# Patient Record
Sex: FEMALE | Race: WHITE | Employment: OTHER | URBAN - METROPOLITAN AREA
[De-identification: names, ages, dates, MRNs, and addresses within clinical notes are randomized per-mention and may not be internally consistent; named-entity substitution may affect disease eponyms.]

---

## 2017-01-04 RX ORDER — BUSPIRONE HYDROCHLORIDE 7.5 MG/1
TABLET ORAL
Qty: 60 TABLET | Refills: 5 | Status: ON HOLD | OUTPATIENT
Start: 2017-01-04 | End: 2017-03-09 | Stop reason: HOSPADM

## 2017-01-10 RX ORDER — PETROLATUM 430 MG/G
OINTMENT TOPICAL
Qty: 226 G | Refills: 6 | Status: ON HOLD | OUTPATIENT
Start: 2017-01-10 | End: 2020-01-01

## 2017-01-19 ENCOUNTER — HOSPITAL ENCOUNTER (OUTPATIENT)
Dept: OTHER | Age: 77
Discharge: HOME | End: 2017-01-19
Attending: INTERNAL MEDICINE | Admitting: INTERNAL MEDICINE

## 2017-01-19 LAB
BACTERIA: ABNORMAL /HPF
BILIRUBIN URINE: NEGATIVE MG/DL
BLOOD, URINE: ABNORMAL
CLARITY: ABNORMAL
COLOR: YELLOW
GLUCOSE, URINE: NEGATIVE MG/DL
KETONES, URINE: NEGATIVE MG/DL
LEUKOCYTE ESTERASE, URINE: ABNORMAL
MUCUS: ABNORMAL HPF
NITRITE URINE, QUANTITATIVE: NEGATIVE
PH, URINE: 6 (ref 5–8)
PROTEIN UA: 100 MG/DL
RBC URINE: 254 /HPF (ref 0–6)
SPECIFIC GRAVITY UA: 1.01 (ref 1–1.03)
SQUAMOUS EPITHELIAL: <1 /HPF
UROBILINOGEN, URINE: NORMAL EU/DL (ref 0.2–1)
WBC CLUMP: ABNORMAL /HPF
WBC UA: 128 /HPF (ref 0–5)

## 2017-01-19 RX ORDER — DILTIAZEM HYDROCHLORIDE 240 MG/1
CAPSULE, COATED, EXTENDED RELEASE ORAL
Qty: 30 CAPSULE | Refills: 2 | Status: ON HOLD | OUTPATIENT
Start: 2017-01-19 | End: 2017-03-09 | Stop reason: HOSPADM

## 2017-01-20 RX ORDER — NITROFURANTOIN 25; 75 MG/1; MG/1
100 CAPSULE ORAL 2 TIMES DAILY
Qty: 14 CAPSULE | Refills: 0 | Status: SHIPPED | OUTPATIENT
Start: 2017-01-20 | End: 2017-01-25

## 2017-01-21 LAB
CULTURE: NORMAL
ORGANISM: NORMAL
REPORT STATUS: NORMAL
REQUEST PROBLEM: NORMAL
SPECIMEN: NORMAL
TOTAL COLONY COUNT: NORMAL

## 2017-01-23 RX ORDER — ATORVASTATIN CALCIUM 10 MG/1
TABLET, FILM COATED ORAL
Qty: 30 TABLET | Refills: 2 | Status: SHIPPED | OUTPATIENT
Start: 2017-01-23 | End: 2017-04-17 | Stop reason: SDUPTHER

## 2017-01-24 DIAGNOSIS — Z79.01 ANTICOAGULANT LONG-TERM USE: ICD-10-CM

## 2017-01-24 DIAGNOSIS — R32 URINARY INCONTINENCE, UNSPECIFIED TYPE: Primary | ICD-10-CM

## 2017-01-24 DIAGNOSIS — N39.0 URINARY TRACT INFECTION, SITE UNSPECIFIED: ICD-10-CM

## 2017-01-24 PROCEDURE — 85610 PROTHROMBIN TIME: CPT | Performed by: INTERNAL MEDICINE

## 2017-01-24 RX ORDER — CEFTRIAXONE 1 G/1
0.5 INJECTION, POWDER, FOR SOLUTION INTRAMUSCULAR; INTRAVENOUS EVERY 24 HOURS
Qty: 7 G | Refills: 0 | Status: SHIPPED | OUTPATIENT
Start: 2017-01-24 | End: 2017-02-03

## 2017-01-25 ENCOUNTER — TELEPHONE (OUTPATIENT)
Dept: INTERNAL MEDICINE CLINIC | Age: 77
End: 2017-01-25

## 2017-01-25 DIAGNOSIS — E11.41 CONTROLLED TYPE 2 DIABETES MELLITUS WITH DIABETIC MONONEUROPATHY, WITH LONG-TERM CURRENT USE OF INSULIN (HCC): ICD-10-CM

## 2017-01-25 DIAGNOSIS — Z79.4 CONTROLLED TYPE 2 DIABETES MELLITUS WITH DIABETIC MONONEUROPATHY, WITH LONG-TERM CURRENT USE OF INSULIN (HCC): ICD-10-CM

## 2017-01-25 RX ORDER — LIDOCAINE HYDROCHLORIDE 10 MG/ML
1 INJECTION, SOLUTION EPIDURAL; INFILTRATION; INTRACAUDAL; PERINEURAL DAILY
Qty: 10 ML | Refills: 0 | Status: ON HOLD | OUTPATIENT
Start: 2017-01-25 | End: 2017-03-09

## 2017-02-02 DIAGNOSIS — F32.A DEPRESSION, UNSPECIFIED DEPRESSION TYPE: ICD-10-CM

## 2017-02-02 RX ORDER — PAROXETINE HYDROCHLORIDE 40 MG/1
TABLET, FILM COATED ORAL
Qty: 30 TABLET | Refills: 2 | Status: SHIPPED | OUTPATIENT
Start: 2017-02-02

## 2017-02-02 RX ORDER — RANITIDINE 150 MG/1
TABLET ORAL
Qty: 60 TABLET | Refills: 5 | Status: ON HOLD | OUTPATIENT
Start: 2017-02-02 | End: 2017-03-09 | Stop reason: HOSPADM

## 2017-02-02 RX ORDER — DONEPEZIL HYDROCHLORIDE 5 MG/1
TABLET, FILM COATED ORAL
Qty: 30 TABLET | Refills: 1 | Status: SHIPPED | OUTPATIENT
Start: 2017-02-02 | End: 2017-02-14 | Stop reason: SDUPTHER

## 2017-02-08 ENCOUNTER — TELEPHONE (OUTPATIENT)
Dept: INTERNAL MEDICINE CLINIC | Age: 77
End: 2017-02-08

## 2017-02-08 ENCOUNTER — HOSPITAL ENCOUNTER (OUTPATIENT)
Dept: OTHER | Age: 77
Discharge: HOME | End: 2017-02-08
Attending: INTERNAL MEDICINE | Admitting: INTERNAL MEDICINE

## 2017-02-08 LAB
BACTERIA: ABNORMAL /HPF
BILIRUBIN URINE: NEGATIVE MG/DL
BLOOD, URINE: ABNORMAL
CLARITY: ABNORMAL
COLOR: ABNORMAL
GLUCOSE, URINE: NEGATIVE MG/DL
KETONES, URINE: NEGATIVE MG/DL
LEUKOCYTE ESTERASE, URINE: ABNORMAL
MUCUS: ABNORMAL HPF
NITRITE URINE, QUANTITATIVE: NEGATIVE
PH, URINE: 5 (ref 5–8)
PROTEIN UA: NEGATIVE MG/DL
RBC URINE: 34 /HPF (ref 0–6)
SPECIFIC GRAVITY UA: 1.01 (ref 1–1.03)
SQUAMOUS EPITHELIAL: 1 /HPF
UROBILINOGEN, URINE: NORMAL EU/DL (ref 0.2–1)
WBC CLUMP: ABNORMAL /HPF
WBC UA: 76 /HPF (ref 0–5)
YEAST: ABNORMAL /HPF

## 2017-02-09 LAB
CULTURE: NORMAL
CULTURE: NORMAL
REPORT STATUS: NORMAL
REQUEST PROBLEM: NORMAL
SPECIMEN: NORMAL
TOTAL COLONY COUNT: NORMAL

## 2017-02-14 ENCOUNTER — OFFICE VISIT (OUTPATIENT)
Dept: INTERNAL MEDICINE CLINIC | Age: 77
End: 2017-02-14

## 2017-02-14 VITALS
DIASTOLIC BLOOD PRESSURE: 80 MMHG | SYSTOLIC BLOOD PRESSURE: 122 MMHG | OXYGEN SATURATION: 96 % | HEART RATE: 83 BPM | RESPIRATION RATE: 14 BRPM

## 2017-02-14 DIAGNOSIS — J42 CHRONIC BRONCHITIS, UNSPECIFIED CHRONIC BRONCHITIS TYPE (HCC): ICD-10-CM

## 2017-02-14 DIAGNOSIS — E11.41 CONTROLLED TYPE 2 DIABETES MELLITUS WITH DIABETIC MONONEUROPATHY, WITH LONG-TERM CURRENT USE OF INSULIN (HCC): ICD-10-CM

## 2017-02-14 DIAGNOSIS — Z79.4 CONTROLLED TYPE 2 DIABETES MELLITUS WITH DIABETIC MONONEUROPATHY, WITH LONG-TERM CURRENT USE OF INSULIN (HCC): ICD-10-CM

## 2017-02-14 DIAGNOSIS — I48.20 ATRIAL FIBRILLATION, CHRONIC (HCC): ICD-10-CM

## 2017-02-14 DIAGNOSIS — I10 ESSENTIAL HYPERTENSION: ICD-10-CM

## 2017-02-14 DIAGNOSIS — J01.00 ACUTE NON-RECURRENT MAXILLARY SINUSITIS: ICD-10-CM

## 2017-02-14 DIAGNOSIS — M75.81 RIGHT ROTATOR CUFF TENDINITIS: Primary | ICD-10-CM

## 2017-02-14 DIAGNOSIS — R41.3 MEMORY LOSS: ICD-10-CM

## 2017-02-14 PROCEDURE — 99214 OFFICE O/P EST MOD 30 MIN: CPT | Performed by: INTERNAL MEDICINE

## 2017-02-14 RX ORDER — PREDNISONE 1 MG/1
TABLET ORAL
Qty: 21 TABLET | Refills: 0 | Status: ON HOLD | OUTPATIENT
Start: 2017-02-14 | End: 2017-03-09 | Stop reason: HOSPADM

## 2017-02-14 RX ORDER — DONEPEZIL HYDROCHLORIDE 5 MG/1
TABLET, FILM COATED ORAL
Qty: 30 TABLET | Refills: 3 | Status: ON HOLD | OUTPATIENT
Start: 2017-02-14 | End: 2017-03-09 | Stop reason: HOSPADM

## 2017-02-14 RX ORDER — AZITHROMYCIN 250 MG/1
TABLET, FILM COATED ORAL
Qty: 6 TABLET | Refills: 0 | Status: SHIPPED | OUTPATIENT
Start: 2017-02-14 | End: 2017-03-05

## 2017-02-18 DIAGNOSIS — I10 ESSENTIAL HYPERTENSION: ICD-10-CM

## 2017-02-20 RX ORDER — VALSARTAN 160 MG/1
TABLET ORAL
Qty: 60 TABLET | Refills: 1 | Status: SHIPPED | OUTPATIENT
Start: 2017-02-20 | End: 2017-04-17 | Stop reason: SDUPTHER

## 2017-02-20 RX ORDER — DICYCLOMINE HYDROCHLORIDE 10 MG/1
CAPSULE ORAL
Qty: 120 CAPSULE | Refills: 2 | Status: ON HOLD | OUTPATIENT
Start: 2017-02-20 | End: 2017-03-09 | Stop reason: HOSPADM

## 2017-02-22 ENCOUNTER — TELEPHONE (OUTPATIENT)
Dept: INTERNAL MEDICINE CLINIC | Age: 77
End: 2017-02-22

## 2017-02-22 RX ORDER — WARFARIN SODIUM 2.5 MG/1
TABLET ORAL
Qty: 30 TABLET | Refills: 5 | Status: ON HOLD
Start: 2017-02-22 | End: 2017-03-09 | Stop reason: HOSPADM

## 2017-03-01 ENCOUNTER — HOSPITAL ENCOUNTER (OUTPATIENT)
Dept: OTHER | Age: 77
Discharge: HOME | End: 2017-03-01
Attending: INTERNAL MEDICINE | Admitting: INTERNAL MEDICINE

## 2017-03-01 LAB
ALBUMIN SERPL-MCNC: 3.6 GM/DL (ref 3.4–5)
ALP BLD-CCNC: 84 IU/L (ref 40–128)
ALT SERPL-CCNC: 9 U/L (ref 10–40)
ANION GAP SERPL CALCULATED.3IONS-SCNC: 12 MMOL/L (ref 4–16)
AST SERPL-CCNC: 10 IU/L (ref 15–37)
BASOPHILS ABSOLUTE: 0.1 K/CU MM
BASOPHILS RELATIVE PERCENT: 0.5 % (ref 0–1)
BILIRUB SERPL-MCNC: 0.3 MG/DL (ref 0–1)
BUN BLDV-MCNC: 21 MG/DL (ref 6–23)
CALCIUM SERPL-MCNC: 8.8 MG/DL (ref 8.3–10.6)
CHLORIDE BLD-SCNC: 102 MMOL/L (ref 99–110)
CHOLESTEROL: 103 MG/DL
CO2: 29 MMOL/L (ref 21–32)
CREAT SERPL-MCNC: 1.2 MG/DL (ref 0.6–1.1)
DIFFERENTIAL TYPE: ABNORMAL
EOSINOPHILS ABSOLUTE: 0.4 K/CU MM
EOSINOPHILS RELATIVE PERCENT: 3.9 % (ref 0–3)
ESTIMATED AVERAGE GLUCOSE: 114 MG/DL
GFR AFRICAN AMERICAN: 53 ML/MIN/1.73M2
GFR NON-AFRICAN AMERICAN: 43 ML/MIN/1.73M2
GLUCOSE FASTING: 85 MG/DL (ref 70–99)
HBA1C MFR BLD: 5.6 % (ref 4.2–6.3)
HCT VFR BLD CALC: 41.9 % (ref 37–47)
HDLC SERPL-MCNC: 44 MG/DL
HEMOGLOBIN: 12.3 GM/DL (ref 12.5–16)
IMMATURE NEUTROPHIL %: 0.7 % (ref 0–0.43)
LDL CHOLESTEROL DIRECT: 56 MG/DL
LYMPHOCYTES ABSOLUTE: 1.4 K/CU MM
LYMPHOCYTES RELATIVE PERCENT: 14.2 % (ref 24–44)
MCH RBC QN AUTO: 28.1 PG (ref 27–31)
MCHC RBC AUTO-ENTMCNC: 29.4 % (ref 32–36)
MCV RBC AUTO: 95.7 FL (ref 78–100)
MONOCYTES ABSOLUTE: 0.9 K/CU MM
MONOCYTES RELATIVE PERCENT: 9.6 % (ref 0–4)
NUCLEATED RBC %: 0 %
PDW BLD-RTO: 14.4 % (ref 11.7–14.9)
PLATELET # BLD: 277 K/CU MM (ref 140–440)
PMV BLD AUTO: 10.8 FL (ref 7.5–11.1)
POTASSIUM SERPL-SCNC: 4.7 MMOL/L (ref 3.5–5.1)
RBC # BLD: 4.38 M/CU MM (ref 4.2–5.4)
SEGMENTED NEUTROPHILS ABSOLUTE COUNT: 7 K/CU MM
SEGMENTED NEUTROPHILS RELATIVE PERCENT: 71.1 % (ref 36–66)
SODIUM BLD-SCNC: 143 MMOL/L (ref 135–145)
TOTAL IMMATURE NEUTOROPHIL: 0.07 K/CU MM
TOTAL NUCLEATED RBC: 0 K/CU MM
TOTAL PROTEIN: 6.4 GM/DL (ref 6.4–8.2)
TRIGL SERPL-MCNC: 91 MG/DL
TSH HIGH SENSITIVITY: 2.82 UIU/ML (ref 0.27–4.2)
WBC # BLD: 9.8 K/CU MM (ref 4–10.5)

## 2017-03-08 PROBLEM — N17.9 ACUTE KIDNEY INJURY (HCC): Status: ACTIVE | Noted: 2017-03-08

## 2017-03-09 PROBLEM — A41.50 SEPSIS DUE TO GRAM NEGATIVE BACTERIA (HCC): Status: ACTIVE | Noted: 2017-03-09

## 2017-03-20 DIAGNOSIS — I10 ESSENTIAL HYPERTENSION: ICD-10-CM

## 2017-03-20 RX ORDER — HYDROCHLOROTHIAZIDE 25 MG/1
TABLET ORAL
Qty: 30 TABLET | Refills: 2 | Status: ON HOLD | OUTPATIENT
Start: 2017-03-20 | End: 2020-01-01 | Stop reason: HOSPADM

## 2017-04-17 DIAGNOSIS — R32 URINARY INCONTINENCE, UNSPECIFIED TYPE: ICD-10-CM

## 2017-04-17 DIAGNOSIS — I10 ESSENTIAL HYPERTENSION: ICD-10-CM

## 2017-04-17 RX ORDER — SOLIFENACIN SUCCINATE 10 MG/1
TABLET, FILM COATED ORAL
Qty: 30 TABLET | Refills: 3 | Status: SHIPPED | OUTPATIENT
Start: 2017-04-17

## 2017-04-17 RX ORDER — VALSARTAN 160 MG/1
TABLET ORAL
Qty: 60 TABLET | Refills: 1 | Status: ON HOLD | OUTPATIENT
Start: 2017-04-17 | End: 2020-01-01

## 2017-04-17 RX ORDER — ATORVASTATIN CALCIUM 10 MG/1
TABLET, FILM COATED ORAL
Qty: 30 TABLET | Refills: 2 | Status: SHIPPED | OUTPATIENT
Start: 2017-04-17

## 2017-04-17 RX ORDER — GLIPIZIDE 5 MG/1
TABLET ORAL
Qty: 60 TABLET | Refills: 5 | Status: ON HOLD | OUTPATIENT
Start: 2017-04-17 | End: 2019-01-01 | Stop reason: HOSPADM

## 2017-05-02 ENCOUNTER — OFFICE VISIT (OUTPATIENT)
Dept: CARDIOLOGY CLINIC | Age: 77
End: 2017-05-02

## 2017-05-02 VITALS — DIASTOLIC BLOOD PRESSURE: 64 MMHG | HEART RATE: 100 BPM | SYSTOLIC BLOOD PRESSURE: 122 MMHG

## 2017-05-02 DIAGNOSIS — I10 ESSENTIAL HYPERTENSION: ICD-10-CM

## 2017-05-02 DIAGNOSIS — I48.0 PAROXYSMAL ATRIAL FIBRILLATION (HCC): Primary | ICD-10-CM

## 2017-05-02 PROCEDURE — 99214 OFFICE O/P EST MOD 30 MIN: CPT | Performed by: INTERNAL MEDICINE

## 2018-04-16 ENCOUNTER — ANTI-COAG VISIT (OUTPATIENT)
Dept: INTERNAL MEDICINE CLINIC | Age: 78
End: 2018-04-16

## 2018-08-07 ENCOUNTER — HOSPITAL ENCOUNTER (OUTPATIENT)
Dept: SPEECH THERAPY | Age: 78
Discharge: HOME | End: 2018-08-07
Admitting: INTERNAL MEDICINE

## 2018-08-07 DIAGNOSIS — R13.14 DYSPHAGIA, PHARYNGOESOPHAGEAL: ICD-10-CM

## 2018-08-07 NOTE — PROCEDURES
INSTRUMENTAL SWALLOW REPORT  MODIFIED BARIUM SWALLOW      Thank you for referring Kayleen BOWLING  1940 for a modified barium swallow study. Please see attached results and contact me at your convenience if you have any questions or concerns. Elisha David MA, CCC-SLP  Speech/Language Pathologist  [facility]  Department of [facility] Pathology  [phone]  2018  1:28 PM      NAME: Kayleen Torres   : 1940  MRN: [id number]       Date of Eval: 2018     Ordering Physician: Dr. Matt Ramos  Radiologist: Available upon consult  ENT: N/a  Referring Diagnosis(es): Referring Diagnosis: R13.14    Past Medical History:  has a past medical history of Allergic rhinitis; Asthma; Atrial fibrillation ([address] CAD (coronary artery disease); Carotid artery stenosis; Carpal tunnel syndrome, bilateral; COPD (chronic obstructive pulmonary disease) ([address] Depression; Diabetes type 2, controlled ([address] Diabetes type 2, controlled ([address] Emphysema; Family history of diabetes mellitus (DM); Gastro-esophageal reflux disease with esophagitis; H/O 24 hour EKG monitoring; H/O cardiac catheterization; H/O cardiovascular stress test; H/O Doppler ultrasound; H/O echocardiogram; Herniation of lumbar intervertebral disc; Hyperlipidemia; Hypertension; Incontinence of urine; Irritable bowel syndrome; Memory loss; Menopause; Obesity; Obstructive sleep apnea on CPAP; Osteoarthritis; S/P colonoscopy; and Venous insufficiency (chronic) (peripheral). Past Surgical History:  has a past surgical history that includes Carpal tunnel release (); Nasal polyp surgery (); Appendectomy (); Cholecystectomy (); lipoma resection (); and Diagnostic Cardiac Cath Lab Procedure (2003, 2001). Current Diet Solid Consistency: Dysphagia II Mechanically Altered  Current Diet Liquid Consistency:  Thin    Date of Prior Study: N/a  Type of Study: Initial MBS  Results of

## 2019-01-01 ENCOUNTER — TELEPHONE (OUTPATIENT)
Dept: ORTHOPEDIC SURGERY | Age: 79
End: 2019-01-01

## 2019-01-01 ENCOUNTER — APPOINTMENT (OUTPATIENT)
Dept: GENERAL RADIOLOGY | Age: 79
DRG: 580 | End: 2019-01-01
Payer: MEDICARE

## 2019-01-01 ENCOUNTER — ANESTHESIA (OUTPATIENT)
Dept: OPERATING ROOM | Age: 79
DRG: 580 | End: 2019-01-01
Payer: MEDICARE

## 2019-01-01 ENCOUNTER — ANESTHESIA EVENT (OUTPATIENT)
Dept: OPERATING ROOM | Age: 79
DRG: 580 | End: 2019-01-01
Payer: MEDICARE

## 2019-01-01 ENCOUNTER — APPOINTMENT (OUTPATIENT)
Dept: CT IMAGING | Age: 79
DRG: 580 | End: 2019-01-01
Payer: MEDICARE

## 2019-01-01 ENCOUNTER — HOSPITAL ENCOUNTER (INPATIENT)
Age: 79
LOS: 2 days | Discharge: NURSING, REHAB OR LONG-TERM CARE | DRG: 580 | End: 2019-12-08
Attending: EMERGENCY MEDICINE | Admitting: INTERNAL MEDICINE
Payer: MEDICARE

## 2019-01-01 ENCOUNTER — OFFICE VISIT (OUTPATIENT)
Dept: ORTHOPEDIC SURGERY | Age: 79
End: 2019-01-01

## 2019-01-01 VITALS
WEIGHT: 184.08 LBS | RESPIRATION RATE: 16 BRPM | OXYGEN SATURATION: 92 % | BODY MASS INDEX: 27.27 KG/M2 | HEART RATE: 74 BPM | HEIGHT: 69 IN

## 2019-01-01 VITALS
HEART RATE: 69 BPM | BODY MASS INDEX: 27.25 KG/M2 | SYSTOLIC BLOOD PRESSURE: 117 MMHG | WEIGHT: 184 LBS | TEMPERATURE: 98 F | DIASTOLIC BLOOD PRESSURE: 104 MMHG | HEIGHT: 69 IN | OXYGEN SATURATION: 98 % | RESPIRATION RATE: 17 BRPM

## 2019-01-01 VITALS
SYSTOLIC BLOOD PRESSURE: 105 MMHG | RESPIRATION RATE: 1 BRPM | DIASTOLIC BLOOD PRESSURE: 70 MMHG | OXYGEN SATURATION: 100 %

## 2019-01-01 DIAGNOSIS — Z09 POSTOP CHECK: Primary | ICD-10-CM

## 2019-01-01 DIAGNOSIS — S80.11XA TRAUMATIC HEMATOMA OF RIGHT LOWER LEG, INITIAL ENCOUNTER: Primary | ICD-10-CM

## 2019-01-01 DIAGNOSIS — I96 SKIN NECROSIS (HCC): ICD-10-CM

## 2019-01-01 DIAGNOSIS — Z79.01 ANTICOAGULATED: ICD-10-CM

## 2019-01-01 DIAGNOSIS — R29.898 WEAKNESS OF RIGHT LOWER EXTREMITY: ICD-10-CM

## 2019-01-01 LAB
ABO/RH: NORMAL
ALBUMIN SERPL-MCNC: 3.4 GM/DL (ref 3.4–5)
ALBUMIN SERPL-MCNC: 3.7 GM/DL (ref 3.4–5)
ALBUMIN SERPL-MCNC: 3.7 GM/DL (ref 3.4–5)
ALP BLD-CCNC: 84 IU/L (ref 40–128)
ALP BLD-CCNC: 95 IU/L (ref 40–128)
ALP BLD-CCNC: 96 IU/L (ref 40–128)
ALT SERPL-CCNC: 5 U/L (ref 10–40)
ALT SERPL-CCNC: <5 U/L (ref 10–40)
ALT SERPL-CCNC: <5 U/L (ref 10–40)
ANION GAP SERPL CALCULATED.3IONS-SCNC: 14 MMOL/L (ref 4–16)
ANION GAP SERPL CALCULATED.3IONS-SCNC: 16 MMOL/L (ref 4–16)
ANION GAP SERPL CALCULATED.3IONS-SCNC: 16 MMOL/L (ref 4–16)
ANTIBODY SCREEN: NEGATIVE
APTT: 43.8 SECONDS (ref 25.1–37.1)
AST SERPL-CCNC: 11 IU/L (ref 15–37)
AST SERPL-CCNC: 11 IU/L (ref 15–37)
AST SERPL-CCNC: 12 IU/L (ref 15–37)
BASOPHILS ABSOLUTE: 0 K/CU MM
BASOPHILS ABSOLUTE: 0 K/CU MM
BASOPHILS RELATIVE PERCENT: 0.3 % (ref 0–1)
BASOPHILS RELATIVE PERCENT: 0.6 % (ref 0–1)
BILIRUB SERPL-MCNC: 0.4 MG/DL (ref 0–1)
BILIRUB SERPL-MCNC: 0.4 MG/DL (ref 0–1)
BILIRUB SERPL-MCNC: 0.5 MG/DL (ref 0–1)
BUN BLDV-MCNC: 11 MG/DL (ref 6–23)
BUN BLDV-MCNC: 14 MG/DL (ref 6–23)
BUN BLDV-MCNC: 18 MG/DL (ref 6–23)
CALCIUM SERPL-MCNC: 8.4 MG/DL (ref 8.3–10.6)
CALCIUM SERPL-MCNC: 8.7 MG/DL (ref 8.3–10.6)
CALCIUM SERPL-MCNC: 8.8 MG/DL (ref 8.3–10.6)
CHLORIDE BLD-SCNC: 97 MMOL/L (ref 99–110)
CHLORIDE BLD-SCNC: 99 MMOL/L (ref 99–110)
CHLORIDE BLD-SCNC: 99 MMOL/L (ref 99–110)
CO2: 19 MMOL/L (ref 21–32)
CO2: 20 MMOL/L (ref 21–32)
CO2: 25 MMOL/L (ref 21–32)
CREAT SERPL-MCNC: 0.8 MG/DL (ref 0.6–1.1)
CREAT SERPL-MCNC: 1 MG/DL (ref 0.6–1.1)
CREAT SERPL-MCNC: 1.2 MG/DL (ref 0.6–1.1)
DIFFERENTIAL TYPE: ABNORMAL
DIFFERENTIAL TYPE: ABNORMAL
EKG DIAGNOSIS: NORMAL
EKG Q-T INTERVAL: 410 MS
EKG QRS DURATION: 74 MS
EKG QTC CALCULATION (BAZETT): 416 MS
EKG R AXIS: -16 DEGREES
EKG T AXIS: 23 DEGREES
EKG VENTRICULAR RATE: 62 BPM
EOSINOPHILS ABSOLUTE: 0 K/CU MM
EOSINOPHILS ABSOLUTE: 0.2 K/CU MM
EOSINOPHILS RELATIVE PERCENT: 0.5 % (ref 0–3)
EOSINOPHILS RELATIVE PERCENT: 3 % (ref 0–3)
ESTIMATED AVERAGE GLUCOSE: 88 MG/DL
GFR AFRICAN AMERICAN: 52 ML/MIN/1.73M2
GFR AFRICAN AMERICAN: >60 ML/MIN/1.73M2
GFR AFRICAN AMERICAN: >60 ML/MIN/1.73M2
GFR NON-AFRICAN AMERICAN: 43 ML/MIN/1.73M2
GFR NON-AFRICAN AMERICAN: 53 ML/MIN/1.73M2
GFR NON-AFRICAN AMERICAN: >60 ML/MIN/1.73M2
GLUCOSE BLD-MCNC: 100 MG/DL (ref 70–99)
GLUCOSE BLD-MCNC: 107 MG/DL (ref 70–99)
GLUCOSE BLD-MCNC: 115 MG/DL (ref 70–99)
GLUCOSE BLD-MCNC: 118 MG/DL (ref 70–99)
GLUCOSE BLD-MCNC: 124 MG/DL (ref 70–99)
GLUCOSE BLD-MCNC: 128 MG/DL (ref 70–99)
GLUCOSE BLD-MCNC: 135 MG/DL (ref 70–99)
GLUCOSE BLD-MCNC: 173 MG/DL (ref 70–99)
GLUCOSE BLD-MCNC: 196 MG/DL (ref 70–99)
GLUCOSE BLD-MCNC: 69 MG/DL (ref 70–99)
GLUCOSE BLD-MCNC: 69 MG/DL (ref 70–99)
GLUCOSE BLD-MCNC: 71 MG/DL (ref 70–99)
GLUCOSE BLD-MCNC: 84 MG/DL (ref 70–99)
GLUCOSE BLD-MCNC: 85 MG/DL (ref 70–99)
GLUCOSE BLD-MCNC: 96 MG/DL (ref 70–99)
GLUCOSE BLD-MCNC: 97 MG/DL (ref 70–99)
HBA1C MFR BLD: 4.7 % (ref 4.2–6.3)
HCT VFR BLD CALC: 36 % (ref 37–47)
HCT VFR BLD CALC: 38.9 % (ref 37–47)
HEMOGLOBIN: 10.3 GM/DL (ref 12.5–16)
HEMOGLOBIN: 11.7 GM/DL (ref 12.5–16)
IMMATURE NEUTROPHIL %: 0.4 % (ref 0–0.43)
IMMATURE NEUTROPHIL %: 0.4 % (ref 0–0.43)
INR BLD: 1.51 INDEX
LYMPHOCYTES ABSOLUTE: 0.7 K/CU MM
LYMPHOCYTES ABSOLUTE: 1 K/CU MM
LYMPHOCYTES RELATIVE PERCENT: 14.4 % (ref 24–44)
LYMPHOCYTES RELATIVE PERCENT: 9.1 % (ref 24–44)
MAGNESIUM: 1.6 MG/DL (ref 1.8–2.4)
MCH RBC QN AUTO: 30.8 PG (ref 27–31)
MCH RBC QN AUTO: 31.3 PG (ref 27–31)
MCHC RBC AUTO-ENTMCNC: 28.6 % (ref 32–36)
MCHC RBC AUTO-ENTMCNC: 30.1 % (ref 32–36)
MCV RBC AUTO: 102.4 FL (ref 78–100)
MCV RBC AUTO: 109.4 FL (ref 78–100)
MONOCYTES ABSOLUTE: 0.6 K/CU MM
MONOCYTES ABSOLUTE: 0.8 K/CU MM
MONOCYTES RELATIVE PERCENT: 11.3 % (ref 0–4)
MONOCYTES RELATIVE PERCENT: 7.5 % (ref 0–4)
NUCLEATED RBC %: 0 %
NUCLEATED RBC %: 0 %
PDW BLD-RTO: 13.7 % (ref 11.7–14.9)
PDW BLD-RTO: 13.9 % (ref 11.7–14.9)
PLATELET # BLD: 257 K/CU MM (ref 140–440)
PLATELET # BLD: 260 K/CU MM (ref 140–440)
PMV BLD AUTO: 9.5 FL (ref 7.5–11.1)
PMV BLD AUTO: 9.7 FL (ref 7.5–11.1)
POTASSIUM SERPL-SCNC: 3.6 MMOL/L (ref 3.5–5.1)
POTASSIUM SERPL-SCNC: 3.9 MMOL/L (ref 3.5–5.1)
POTASSIUM SERPL-SCNC: 4.1 MMOL/L (ref 3.5–5.1)
PROTHROMBIN TIME: 18.4 SECONDS (ref 11.7–14.5)
RBC # BLD: 3.29 M/CU MM (ref 4.2–5.4)
RBC # BLD: 3.8 M/CU MM (ref 4.2–5.4)
REASON FOR REJECTION: NORMAL
REASON FOR REJECTION: NORMAL
REJECTED TEST: NORMAL
SEGMENTED NEUTROPHILS ABSOLUTE COUNT: 5 K/CU MM
SEGMENTED NEUTROPHILS ABSOLUTE COUNT: 6.4 K/CU MM
SEGMENTED NEUTROPHILS RELATIVE PERCENT: 70.3 % (ref 36–66)
SEGMENTED NEUTROPHILS RELATIVE PERCENT: 82.2 % (ref 36–66)
SODIUM BLD-SCNC: 133 MMOL/L (ref 135–145)
SODIUM BLD-SCNC: 134 MMOL/L (ref 135–145)
SODIUM BLD-SCNC: 138 MMOL/L (ref 135–145)
TOTAL IMMATURE NEUTOROPHIL: 0.03 K/CU MM
TOTAL IMMATURE NEUTOROPHIL: 0.03 K/CU MM
TOTAL NUCLEATED RBC: 0 K/CU MM
TOTAL NUCLEATED RBC: 0 K/CU MM
TOTAL PROTEIN: 5.5 GM/DL (ref 6.4–8.2)
TOTAL PROTEIN: 6 GM/DL (ref 6.4–8.2)
TOTAL PROTEIN: 6.5 GM/DL (ref 6.4–8.2)
WBC # BLD: 7.1 K/CU MM (ref 4–10.5)
WBC # BLD: 7.8 K/CU MM (ref 4–10.5)

## 2019-01-01 PROCEDURE — 2709999900 HC NON-CHARGEABLE SUPPLY: Performed by: ORTHOPAEDIC SURGERY

## 2019-01-01 PROCEDURE — 3600000002 HC SURGERY LEVEL 2 BASE: Performed by: ORTHOPAEDIC SURGERY

## 2019-01-01 PROCEDURE — 82962 GLUCOSE BLOOD TEST: CPT

## 2019-01-01 PROCEDURE — 6360000002 HC RX W HCPCS: Performed by: INTERNAL MEDICINE

## 2019-01-01 PROCEDURE — 96374 THER/PROPH/DIAG INJ IV PUSH: CPT

## 2019-01-01 PROCEDURE — 93005 ELECTROCARDIOGRAM TRACING: CPT | Performed by: ANESTHESIOLOGY

## 2019-01-01 PROCEDURE — 6360000002 HC RX W HCPCS: Performed by: ORTHOPAEDIC SURGERY

## 2019-01-01 PROCEDURE — 80053 COMPREHEN METABOLIC PANEL: CPT

## 2019-01-01 PROCEDURE — 94761 N-INVAS EAR/PLS OXIMETRY MLT: CPT

## 2019-01-01 PROCEDURE — 85610 PROTHROMBIN TIME: CPT

## 2019-01-01 PROCEDURE — 2580000003 HC RX 258: Performed by: INTERNAL MEDICINE

## 2019-01-01 PROCEDURE — 0J9N0ZZ DRAINAGE OF RIGHT LOWER LEG SUBCUTANEOUS TISSUE AND FASCIA, OPEN APPROACH: ICD-10-PCS | Performed by: ORTHOPAEDIC SURGERY

## 2019-01-01 PROCEDURE — 6370000000 HC RX 637 (ALT 250 FOR IP): Performed by: NURSE PRACTITIONER

## 2019-01-01 PROCEDURE — 97162 PT EVAL MOD COMPLEX 30 MIN: CPT

## 2019-01-01 PROCEDURE — 36415 COLL VENOUS BLD VENIPUNCTURE: CPT

## 2019-01-01 PROCEDURE — 86850 RBC ANTIBODY SCREEN: CPT

## 2019-01-01 PROCEDURE — 83735 ASSAY OF MAGNESIUM: CPT

## 2019-01-01 PROCEDURE — 93010 ELECTROCARDIOGRAM REPORT: CPT | Performed by: INTERNAL MEDICINE

## 2019-01-01 PROCEDURE — 2580000003 HC RX 258: Performed by: EMERGENCY MEDICINE

## 2019-01-01 PROCEDURE — 3700000000 HC ANESTHESIA ATTENDED CARE: Performed by: ORTHOPAEDIC SURGERY

## 2019-01-01 PROCEDURE — 6370000000 HC RX 637 (ALT 250 FOR IP): Performed by: ORTHOPAEDIC SURGERY

## 2019-01-01 PROCEDURE — 2500000003 HC RX 250 WO HCPCS: Performed by: NURSE ANESTHETIST, CERTIFIED REGISTERED

## 2019-01-01 PROCEDURE — 6370000000 HC RX 637 (ALT 250 FOR IP): Performed by: INTERNAL MEDICINE

## 2019-01-01 PROCEDURE — 2580000003 HC RX 258: Performed by: ORTHOPAEDIC SURGERY

## 2019-01-01 PROCEDURE — 27603 DRAIN LOWER LEG LESION: CPT | Performed by: ORTHOPAEDIC SURGERY

## 2019-01-01 PROCEDURE — 70450 CT HEAD/BRAIN W/O DYE: CPT

## 2019-01-01 PROCEDURE — 6360000004 HC RX CONTRAST MEDICATION: Performed by: EMERGENCY MEDICINE

## 2019-01-01 PROCEDURE — 6360000002 HC RX W HCPCS: Performed by: NURSE ANESTHETIST, CERTIFIED REGISTERED

## 2019-01-01 PROCEDURE — 2580000003 HC RX 258: Performed by: ANESTHESIOLOGY

## 2019-01-01 PROCEDURE — 73590 X-RAY EXAM OF LOWER LEG: CPT

## 2019-01-01 PROCEDURE — 1200000000 HC SEMI PRIVATE

## 2019-01-01 PROCEDURE — 73706 CT ANGIO LWR EXTR W/O&W/DYE: CPT

## 2019-01-01 PROCEDURE — 72125 CT NECK SPINE W/O DYE: CPT

## 2019-01-01 PROCEDURE — 99221 1ST HOSP IP/OBS SF/LOW 40: CPT | Performed by: INTERNAL MEDICINE

## 2019-01-01 PROCEDURE — 99221 1ST HOSP IP/OBS SF/LOW 40: CPT | Performed by: ORTHOPAEDIC SURGERY

## 2019-01-01 PROCEDURE — 72170 X-RAY EXAM OF PELVIS: CPT

## 2019-01-01 PROCEDURE — 86900 BLOOD TYPING SEROLOGIC ABO: CPT

## 2019-01-01 PROCEDURE — 83036 HEMOGLOBIN GLYCOSYLATED A1C: CPT

## 2019-01-01 PROCEDURE — 85025 COMPLETE CBC W/AUTO DIFF WBC: CPT

## 2019-01-01 PROCEDURE — 3700000001 HC ADD 15 MINUTES (ANESTHESIA): Performed by: ORTHOPAEDIC SURGERY

## 2019-01-01 PROCEDURE — 85730 THROMBOPLASTIN TIME PARTIAL: CPT

## 2019-01-01 PROCEDURE — 3600000012 HC SURGERY LEVEL 2 ADDTL 15MIN: Performed by: ORTHOPAEDIC SURGERY

## 2019-01-01 PROCEDURE — 6370000000 HC RX 637 (ALT 250 FOR IP): Performed by: EMERGENCY MEDICINE

## 2019-01-01 PROCEDURE — 99285 EMERGENCY DEPT VISIT HI MDM: CPT

## 2019-01-01 PROCEDURE — 99232 SBSQ HOSP IP/OBS MODERATE 35: CPT | Performed by: INTERNAL MEDICINE

## 2019-01-01 PROCEDURE — 99024 POSTOP FOLLOW-UP VISIT: CPT | Performed by: PHYSICIAN ASSISTANT

## 2019-01-01 PROCEDURE — 97110 THERAPEUTIC EXERCISES: CPT

## 2019-01-01 PROCEDURE — 86901 BLOOD TYPING SEROLOGIC RH(D): CPT

## 2019-01-01 RX ORDER — CEFAZOLIN SODIUM 2 G/50ML
2 SOLUTION INTRAVENOUS EVERY 8 HOURS
Status: DISCONTINUED | OUTPATIENT
Start: 2019-01-01 | End: 2019-01-01

## 2019-01-01 RX ORDER — SODIUM CHLORIDE 0.9 % (FLUSH) 0.9 %
10 SYRINGE (ML) INJECTION PRN
Status: DISCONTINUED | OUTPATIENT
Start: 2019-01-01 | End: 2019-01-01 | Stop reason: HOSPADM

## 2019-01-01 RX ORDER — FLUTICASONE PROPIONATE 50 MCG
1 SPRAY, SUSPENSION (ML) NASAL DAILY
COMMUNITY

## 2019-01-01 RX ORDER — MENTHOL AND ZINC OXIDE .44; 20.625 G/100G; G/100G
OINTMENT TOPICAL DAILY
Status: ON HOLD | COMMUNITY
End: 2020-01-01

## 2019-01-01 RX ORDER — SODIUM CHLORIDE 0.9 % (FLUSH) 0.9 %
10 SYRINGE (ML) INJECTION EVERY 12 HOURS SCHEDULED
Status: DISCONTINUED | OUTPATIENT
Start: 2019-01-01 | End: 2019-01-01 | Stop reason: HOSPADM

## 2019-01-01 RX ORDER — SODIUM CHLORIDE, SODIUM LACTATE, POTASSIUM CHLORIDE, CALCIUM CHLORIDE 600; 310; 30; 20 MG/100ML; MG/100ML; MG/100ML; MG/100ML
INJECTION, SOLUTION INTRAVENOUS CONTINUOUS
Status: DISCONTINUED | OUTPATIENT
Start: 2019-01-01 | End: 2019-01-01 | Stop reason: HOSPADM

## 2019-01-01 RX ORDER — ONDANSETRON 2 MG/ML
4 INJECTION INTRAMUSCULAR; INTRAVENOUS EVERY 6 HOURS PRN
Status: DISCONTINUED | OUTPATIENT
Start: 2019-01-01 | End: 2019-01-01 | Stop reason: HOSPADM

## 2019-01-01 RX ORDER — PAROXETINE HYDROCHLORIDE 20 MG/1
20 TABLET, FILM COATED ORAL DAILY
Status: DISCONTINUED | OUTPATIENT
Start: 2019-01-01 | End: 2019-01-01 | Stop reason: HOSPADM

## 2019-01-01 RX ORDER — MAGNESIUM SULFATE IN WATER 40 MG/ML
2 INJECTION, SOLUTION INTRAVENOUS ONCE
Status: COMPLETED | OUTPATIENT
Start: 2019-01-01 | End: 2019-01-01

## 2019-01-01 RX ORDER — ROPINIROLE 0.25 MG/1
0.5 TABLET, FILM COATED ORAL 3 TIMES DAILY
Status: DISCONTINUED | OUTPATIENT
Start: 2019-01-01 | End: 2019-01-01 | Stop reason: HOSPADM

## 2019-01-01 RX ORDER — CEFAZOLIN SODIUM 2 G/100ML
2 INJECTION, SOLUTION INTRAVENOUS ONCE
Status: COMPLETED | OUTPATIENT
Start: 2019-01-01 | End: 2019-01-01

## 2019-01-01 RX ORDER — INSULIN GLARGINE 100 [IU]/ML
10 INJECTION, SOLUTION SUBCUTANEOUS NIGHTLY
Status: DISCONTINUED | OUTPATIENT
Start: 2019-01-01 | End: 2019-01-01 | Stop reason: HOSPADM

## 2019-01-01 RX ORDER — LOSARTAN POTASSIUM 50 MG/1
50 TABLET ORAL DAILY
Status: ON HOLD | COMMUNITY
End: 2019-01-01 | Stop reason: HOSPADM

## 2019-01-01 RX ORDER — DONEPEZIL HYDROCHLORIDE 10 MG/1
10 TABLET, FILM COATED ORAL NIGHTLY
Status: DISCONTINUED | OUTPATIENT
Start: 2019-01-01 | End: 2019-01-01 | Stop reason: HOSPADM

## 2019-01-01 RX ORDER — CEFAZOLIN SODIUM 2 G/100ML
INJECTION, SOLUTION INTRAVENOUS
Status: COMPLETED
Start: 2019-01-01 | End: 2019-01-01

## 2019-01-01 RX ORDER — CEFAZOLIN SODIUM 2 G/100ML
2 INJECTION, SOLUTION INTRAVENOUS EVERY 8 HOURS
Status: COMPLETED | OUTPATIENT
Start: 2019-01-01 | End: 2019-01-01

## 2019-01-01 RX ORDER — FAMOTIDINE 20 MG/1
20 TABLET, FILM COATED ORAL DAILY
Status: DISCONTINUED | OUTPATIENT
Start: 2019-01-01 | End: 2019-01-01 | Stop reason: HOSPADM

## 2019-01-01 RX ORDER — KETAMINE HYDROCHLORIDE 10 MG/ML
INJECTION, SOLUTION INTRAMUSCULAR; INTRAVENOUS PRN
Status: DISCONTINUED | OUTPATIENT
Start: 2019-01-01 | End: 2019-01-01 | Stop reason: SDUPTHER

## 2019-01-01 RX ORDER — LIDOCAINE HYDROCHLORIDE 20 MG/ML
INJECTION, SOLUTION INTRAVENOUS PRN
Status: DISCONTINUED | OUTPATIENT
Start: 2019-01-01 | End: 2019-01-01 | Stop reason: SDUPTHER

## 2019-01-01 RX ORDER — MORPHINE SULFATE 4 MG/ML
2 INJECTION, SOLUTION INTRAMUSCULAR; INTRAVENOUS EVERY 4 HOURS PRN
Status: DISCONTINUED | OUTPATIENT
Start: 2019-01-01 | End: 2019-01-01 | Stop reason: HOSPADM

## 2019-01-01 RX ORDER — ACETAMINOPHEN 500 MG
1000 TABLET ORAL EVERY 8 HOURS PRN
COMMUNITY

## 2019-01-01 RX ORDER — DILTIAZEM HYDROCHLORIDE 120 MG/1
120 CAPSULE, COATED, EXTENDED RELEASE ORAL DAILY
Status: DISCONTINUED | OUTPATIENT
Start: 2019-01-01 | End: 2019-01-01 | Stop reason: HOSPADM

## 2019-01-01 RX ORDER — BUSPIRONE HYDROCHLORIDE 7.5 MG/1
7.5 TABLET ORAL 2 TIMES DAILY
Status: DISCONTINUED | OUTPATIENT
Start: 2019-01-01 | End: 2019-01-01 | Stop reason: HOSPADM

## 2019-01-01 RX ORDER — ALBUTEROL SULFATE 90 UG/1
2 AEROSOL, METERED RESPIRATORY (INHALATION) EVERY 6 HOURS PRN
Status: DISCONTINUED | OUTPATIENT
Start: 2019-01-01 | End: 2019-01-01 | Stop reason: HOSPADM

## 2019-01-01 RX ORDER — DOCUSATE SODIUM 100 MG/1
100 CAPSULE, LIQUID FILLED ORAL 2 TIMES DAILY
Status: DISCONTINUED | OUTPATIENT
Start: 2019-01-01 | End: 2019-01-01 | Stop reason: HOSPADM

## 2019-01-01 RX ORDER — FLUTICASONE PROPIONATE 50 MCG
1 SPRAY, SUSPENSION (ML) NASAL 2 TIMES DAILY
Status: DISCONTINUED | OUTPATIENT
Start: 2019-01-01 | End: 2019-01-01 | Stop reason: HOSPADM

## 2019-01-01 RX ORDER — ACETAMINOPHEN 325 MG/1
650 TABLET ORAL EVERY 4 HOURS PRN
Status: DISCONTINUED | OUTPATIENT
Start: 2019-01-01 | End: 2019-01-01 | Stop reason: HOSPADM

## 2019-01-01 RX ORDER — DEXTROSE MONOHYDRATE 25 G/50ML
12.5 INJECTION, SOLUTION INTRAVENOUS PRN
Status: DISCONTINUED | OUTPATIENT
Start: 2019-01-01 | End: 2019-01-01 | Stop reason: HOSPADM

## 2019-01-01 RX ORDER — FENTANYL CITRATE 50 UG/ML
INJECTION, SOLUTION INTRAMUSCULAR; INTRAVENOUS PRN
Status: DISCONTINUED | OUTPATIENT
Start: 2019-01-01 | End: 2019-01-01 | Stop reason: SDUPTHER

## 2019-01-01 RX ORDER — NICOTINE POLACRILEX 4 MG
15 LOZENGE BUCCAL PRN
Status: DISCONTINUED | OUTPATIENT
Start: 2019-01-01 | End: 2019-01-01 | Stop reason: HOSPADM

## 2019-01-01 RX ORDER — SODIUM CHLORIDE, SODIUM LACTATE, POTASSIUM CHLORIDE, CALCIUM CHLORIDE 600; 310; 30; 20 MG/100ML; MG/100ML; MG/100ML; MG/100ML
INJECTION, SOLUTION INTRAVENOUS ONCE
Status: COMPLETED | OUTPATIENT
Start: 2019-01-01 | End: 2019-01-01

## 2019-01-01 RX ORDER — SODIUM CHLORIDE 0.9 % (FLUSH) 0.9 %
10 SYRINGE (ML) INJECTION 2 TIMES DAILY
Status: DISCONTINUED | OUTPATIENT
Start: 2019-01-01 | End: 2019-01-01 | Stop reason: HOSPADM

## 2019-01-01 RX ORDER — PROPOFOL 10 MG/ML
INJECTION, EMULSION INTRAVENOUS PRN
Status: DISCONTINUED | OUTPATIENT
Start: 2019-01-01 | End: 2019-01-01 | Stop reason: SDUPTHER

## 2019-01-01 RX ORDER — HYDROCODONE BITARTRATE AND ACETAMINOPHEN 5; 325 MG/1; MG/1
1 TABLET ORAL ONCE
Status: COMPLETED | OUTPATIENT
Start: 2019-01-01 | End: 2019-01-01

## 2019-01-01 RX ORDER — LOPERAMIDE HYDROCHLORIDE 2 MG/1
2 CAPSULE ORAL 4 TIMES DAILY PRN
COMMUNITY

## 2019-01-01 RX ORDER — DEXTROSE MONOHYDRATE 50 MG/ML
100 INJECTION, SOLUTION INTRAVENOUS PRN
Status: DISCONTINUED | OUTPATIENT
Start: 2019-01-01 | End: 2019-01-01 | Stop reason: HOSPADM

## 2019-01-01 RX ORDER — DOCUSATE SODIUM 100 MG/1
100 CAPSULE, LIQUID FILLED ORAL DAILY
COMMUNITY

## 2019-01-01 RX ADMIN — PROPOFOL 10 MG: 10 INJECTION, EMULSION INTRAVENOUS at 12:18

## 2019-01-01 RX ADMIN — CEFAZOLIN SODIUM 2 G: 2 INJECTION, SOLUTION INTRAVENOUS at 12:17

## 2019-01-01 RX ADMIN — PROPOFOL 10 MG: 10 INJECTION, EMULSION INTRAVENOUS at 12:28

## 2019-01-01 RX ADMIN — ROPINIROLE HYDROCHLORIDE 0.5 MG: 0.25 TABLET, FILM COATED ORAL at 05:34

## 2019-01-01 RX ADMIN — INSULIN GLARGINE 10 UNITS: 100 INJECTION, SOLUTION SUBCUTANEOUS at 21:30

## 2019-01-01 RX ADMIN — ROPINIROLE HYDROCHLORIDE 0.5 MG: 0.25 TABLET, FILM COATED ORAL at 21:29

## 2019-01-01 RX ADMIN — DOCUSATE SODIUM 100 MG: 100 CAPSULE, LIQUID FILLED ORAL at 21:33

## 2019-01-01 RX ADMIN — FLUTICASONE PROPIONATE 1 SPRAY: 50 SPRAY, METERED NASAL at 10:22

## 2019-01-01 RX ADMIN — BUSPIRONE HYDROCHLORIDE 7.5 MG: 7.5 TABLET ORAL at 21:33

## 2019-01-01 RX ADMIN — PROPOFOL 10 MG: 10 INJECTION, EMULSION INTRAVENOUS at 12:38

## 2019-01-01 RX ADMIN — FAMOTIDINE 20 MG: 20 TABLET, FILM COATED ORAL at 08:12

## 2019-01-01 RX ADMIN — ROPINIROLE HYDROCHLORIDE 0.5 MG: 0.25 TABLET, FILM COATED ORAL at 16:25

## 2019-01-01 RX ADMIN — DOCUSATE SODIUM 100 MG: 100 CAPSULE, LIQUID FILLED ORAL at 21:28

## 2019-01-01 RX ADMIN — MAGNESIUM SULFATE HEPTAHYDRATE 2 G: 40 INJECTION, SOLUTION INTRAVENOUS at 11:57

## 2019-01-01 RX ADMIN — HYDROCODONE BITARTRATE AND ACETAMINOPHEN 1 TABLET: 5; 325 TABLET ORAL at 02:31

## 2019-01-01 RX ADMIN — BUSPIRONE HYDROCHLORIDE 7.5 MG: 7.5 TABLET ORAL at 21:28

## 2019-01-01 RX ADMIN — APIXABAN 2.5 MG: 2.5 TABLET, FILM COATED ORAL at 10:20

## 2019-01-01 RX ADMIN — ACETAMINOPHEN 650 MG: 325 TABLET ORAL at 21:33

## 2019-01-01 RX ADMIN — FENTANYL CITRATE 50 MCG: 50 INJECTION INTRAMUSCULAR; INTRAVENOUS at 12:09

## 2019-01-01 RX ADMIN — Medication 10 ML: at 09:43

## 2019-01-01 RX ADMIN — PROPOFOL 10 MG: 10 INJECTION, EMULSION INTRAVENOUS at 12:34

## 2019-01-01 RX ADMIN — FENTANYL CITRATE 25 MCG: 50 INJECTION INTRAMUSCULAR; INTRAVENOUS at 12:28

## 2019-01-01 RX ADMIN — FLUTICASONE PROPIONATE 1 SPRAY: 50 SPRAY, METERED NASAL at 21:29

## 2019-01-01 RX ADMIN — DOCUSATE SODIUM 100 MG: 100 CAPSULE, LIQUID FILLED ORAL at 10:20

## 2019-01-01 RX ADMIN — HYDROMORPHONE HYDROCHLORIDE 0.5 MG: 1 INJECTION, SOLUTION INTRAMUSCULAR; INTRAVENOUS; SUBCUTANEOUS at 10:20

## 2019-01-01 RX ADMIN — ROPINIROLE HYDROCHLORIDE 0.5 MG: 0.25 TABLET, FILM COATED ORAL at 13:59

## 2019-01-01 RX ADMIN — ROPINIROLE HYDROCHLORIDE 0.5 MG: 0.25 TABLET, FILM COATED ORAL at 10:20

## 2019-01-01 RX ADMIN — DONEPEZIL HYDROCHLORIDE 10 MG: 10 TABLET, FILM COATED ORAL at 21:33

## 2019-01-01 RX ADMIN — DILTIAZEM HYDROCHLORIDE 120 MG: 120 CAPSULE, COATED, EXTENDED RELEASE ORAL at 10:20

## 2019-01-01 RX ADMIN — SODIUM CHLORIDE, POTASSIUM CHLORIDE, SODIUM LACTATE AND CALCIUM CHLORIDE: 600; 310; 30; 20 INJECTION, SOLUTION INTRAVENOUS at 21:28

## 2019-01-01 RX ADMIN — Medication 10 ML: at 21:34

## 2019-01-01 RX ADMIN — ACETAMINOPHEN 650 MG: 325 TABLET ORAL at 17:39

## 2019-01-01 RX ADMIN — SODIUM CHLORIDE, POTASSIUM CHLORIDE, SODIUM LACTATE AND CALCIUM CHLORIDE: 600; 310; 30; 20 INJECTION, SOLUTION INTRAVENOUS at 21:33

## 2019-01-01 RX ADMIN — BUSPIRONE HYDROCHLORIDE 7.5 MG: 7.5 TABLET ORAL at 08:12

## 2019-01-01 RX ADMIN — ONDANSETRON 4 MG: 2 INJECTION INTRAMUSCULAR; INTRAVENOUS at 17:59

## 2019-01-01 RX ADMIN — SODIUM CHLORIDE, POTASSIUM CHLORIDE, SODIUM LACTATE AND CALCIUM CHLORIDE: 600; 310; 30; 20 INJECTION, SOLUTION INTRAVENOUS at 12:08

## 2019-01-01 RX ADMIN — FLUTICASONE PROPIONATE 1 SPRAY: 50 SPRAY, METERED NASAL at 08:11

## 2019-01-01 RX ADMIN — Medication 10 ML: at 21:29

## 2019-01-01 RX ADMIN — IOPAMIDOL 80 ML: 755 INJECTION, SOLUTION INTRAVENOUS at 03:37

## 2019-01-01 RX ADMIN — ROPINIROLE HYDROCHLORIDE 0.5 MG: 0.25 TABLET, FILM COATED ORAL at 08:12

## 2019-01-01 RX ADMIN — DEXTROSE 50 % IN WATER (D50W) INTRAVENOUS SYRINGE 12.5 G: at 09:38

## 2019-01-01 RX ADMIN — APIXABAN 2.5 MG: 2.5 TABLET, FILM COATED ORAL at 08:11

## 2019-01-01 RX ADMIN — PROPOFOL 10 MG: 10 INJECTION, EMULSION INTRAVENOUS at 12:15

## 2019-01-01 RX ADMIN — ACETAMINOPHEN 650 MG: 325 TABLET ORAL at 08:12

## 2019-01-01 RX ADMIN — PAROXETINE HYDROCHLORIDE 20 MG: 20 TABLET, FILM COATED ORAL at 10:20

## 2019-01-01 RX ADMIN — MORPHINE SULFATE 2 MG: 4 INJECTION, SOLUTION INTRAMUSCULAR; INTRAVENOUS at 06:41

## 2019-01-01 RX ADMIN — FLUTICASONE PROPIONATE 1 SPRAY: 50 SPRAY, METERED NASAL at 23:28

## 2019-01-01 RX ADMIN — DONEPEZIL HYDROCHLORIDE 10 MG: 10 TABLET, FILM COATED ORAL at 21:29

## 2019-01-01 RX ADMIN — KETAMINE HYDROCHLORIDE 5 MG: 10 INJECTION INTRAMUSCULAR; INTRAVENOUS at 12:24

## 2019-01-01 RX ADMIN — FENTANYL CITRATE 25 MCG: 50 INJECTION INTRAMUSCULAR; INTRAVENOUS at 12:24

## 2019-01-01 RX ADMIN — DOCUSATE SODIUM 100 MG: 100 CAPSULE, LIQUID FILLED ORAL at 08:12

## 2019-01-01 RX ADMIN — DILTIAZEM HYDROCHLORIDE 120 MG: 120 CAPSULE, COATED, EXTENDED RELEASE ORAL at 08:12

## 2019-01-01 RX ADMIN — FAMOTIDINE 20 MG: 20 TABLET, FILM COATED ORAL at 10:20

## 2019-01-01 RX ADMIN — BUSPIRONE HYDROCHLORIDE 7.5 MG: 7.5 TABLET ORAL at 10:20

## 2019-01-01 RX ADMIN — SODIUM CHLORIDE, POTASSIUM CHLORIDE, SODIUM LACTATE AND CALCIUM CHLORIDE: 600; 310; 30; 20 INJECTION, SOLUTION INTRAVENOUS at 14:47

## 2019-01-01 RX ADMIN — PROPOFOL 10 MG: 10 INJECTION, EMULSION INTRAVENOUS at 12:24

## 2019-01-01 RX ADMIN — CEFAZOLIN SODIUM 2 G: 2 INJECTION, SOLUTION INTRAVENOUS at 07:59

## 2019-01-01 RX ADMIN — LIDOCAINE HYDROCHLORIDE 100 MG: 20 INJECTION, SOLUTION INTRAVENOUS at 12:15

## 2019-01-01 RX ADMIN — PROPOFOL 10 MG: 10 INJECTION, EMULSION INTRAVENOUS at 12:21

## 2019-01-01 RX ADMIN — PAROXETINE HYDROCHLORIDE 20 MG: 20 TABLET, FILM COATED ORAL at 08:12

## 2019-01-01 RX ADMIN — KETAMINE HYDROCHLORIDE 10 MG: 10 INJECTION INTRAMUSCULAR; INTRAVENOUS at 12:15

## 2019-01-01 RX ADMIN — SODIUM CHLORIDE, POTASSIUM CHLORIDE, SODIUM LACTATE AND CALCIUM CHLORIDE: 600; 310; 30; 20 INJECTION, SOLUTION INTRAVENOUS at 02:35

## 2019-01-01 RX ADMIN — SODIUM CHLORIDE, POTASSIUM CHLORIDE, SODIUM LACTATE AND CALCIUM CHLORIDE: 600; 310; 30; 20 INJECTION, SOLUTION INTRAVENOUS at 08:11

## 2019-01-01 RX ADMIN — KETAMINE HYDROCHLORIDE 10 MG: 10 INJECTION INTRAMUSCULAR; INTRAVENOUS at 12:20

## 2019-01-01 RX ADMIN — APIXABAN 2.5 MG: 2.5 TABLET, FILM COATED ORAL at 21:28

## 2019-01-01 RX ADMIN — CEFAZOLIN SODIUM 2 G: 2 INJECTION, SOLUTION INTRAVENOUS at 23:28

## 2019-01-01 ASSESSMENT — PULMONARY FUNCTION TESTS
PIF_VALUE: 2
PIF_VALUE: 0
PIF_VALUE: 1
PIF_VALUE: 0
PIF_VALUE: 0
PIF_VALUE: 1
PIF_VALUE: 3
PIF_VALUE: 1
PIF_VALUE: 0
PIF_VALUE: 1
PIF_VALUE: 2
PIF_VALUE: 13
PIF_VALUE: 2
PIF_VALUE: 1
PIF_VALUE: 0
PIF_VALUE: 0
PIF_VALUE: 1
PIF_VALUE: 1
PIF_VALUE: 4
PIF_VALUE: 1
PIF_VALUE: 0
PIF_VALUE: 10
PIF_VALUE: 0
PIF_VALUE: 0
PIF_VALUE: 12
PIF_VALUE: 1
PIF_VALUE: 5
PIF_VALUE: 2
PIF_VALUE: 1
PIF_VALUE: 0
PIF_VALUE: 0
PIF_VALUE: 3
PIF_VALUE: 1
PIF_VALUE: 1
PIF_VALUE: 10
PIF_VALUE: 1
PIF_VALUE: 0
PIF_VALUE: 1
PIF_VALUE: 2

## 2019-01-01 ASSESSMENT — PAIN SCALES - GENERAL
PAINLEVEL_OUTOF10: 10
PAINLEVEL_OUTOF10: 0
PAINLEVEL_OUTOF10: 3
PAINLEVEL_OUTOF10: 3
PAINLEVEL_OUTOF10: 0
PAINLEVEL_OUTOF10: 8
PAINLEVEL_OUTOF10: 10
PAINLEVEL_OUTOF10: 4
PAINLEVEL_OUTOF10: 10
PAINLEVEL_OUTOF10: 10
PAINLEVEL_OUTOF10: 6

## 2019-01-01 ASSESSMENT — ENCOUNTER SYMPTOMS
CHEST TIGHTNESS: 0
BACK PAIN: 0
COLOR CHANGE: 0

## 2019-01-01 ASSESSMENT — PAIN DESCRIPTION - LOCATION
LOCATION: LEG
LOCATION: LEG

## 2019-01-01 ASSESSMENT — COPD QUESTIONNAIRES: CAT_SEVERITY: MODERATE

## 2019-01-01 ASSESSMENT — PAIN DESCRIPTION - ORIENTATION
ORIENTATION: RIGHT;DISTAL;ANTERIOR
ORIENTATION: RIGHT;DISTAL

## 2019-01-01 ASSESSMENT — PAIN DESCRIPTION - DESCRIPTORS: DESCRIPTORS: SORE;THROBBING

## 2019-12-06 PROBLEM — T14.8XXA HEMATOMA: Status: ACTIVE | Noted: 2019-01-01

## 2020-01-01 ENCOUNTER — HOSPITAL ENCOUNTER (OUTPATIENT)
Dept: NON INVASIVE DIAGNOSTICS | Age: 80
Discharge: HOME | End: 2020-03-05
Payer: MEDICARE

## 2020-01-01 ENCOUNTER — APPOINTMENT (OUTPATIENT)
Dept: CT IMAGING | Age: 80
DRG: 698 | End: 2020-01-01
Payer: MEDICARE

## 2020-01-01 ENCOUNTER — APPOINTMENT (OUTPATIENT)
Dept: CT IMAGING | Age: 80
End: 2020-01-01
Payer: COMMERCIAL

## 2020-01-01 ENCOUNTER — OFFICE VISIT (OUTPATIENT)
Dept: ORTHOPEDIC SURGERY | Age: 80
End: 2020-01-01

## 2020-01-01 ENCOUNTER — TELEPHONE (OUTPATIENT)
Dept: CARDIOLOGY CLINIC | Age: 80
End: 2020-01-01

## 2020-01-01 ENCOUNTER — APPOINTMENT (OUTPATIENT)
Dept: MRI IMAGING | Age: 80
DRG: 698 | End: 2020-01-01
Payer: MEDICARE

## 2020-01-01 ENCOUNTER — HOSPITAL ENCOUNTER (EMERGENCY)
Age: 80
Discharge: HOME | End: 2020-01-26
Payer: COMMERCIAL

## 2020-01-01 ENCOUNTER — OFFICE VISIT (OUTPATIENT)
Dept: CARDIOLOGY CLINIC | Age: 80
End: 2020-01-01
Payer: MEDICARE

## 2020-01-01 ENCOUNTER — HOSPITAL ENCOUNTER (INPATIENT)
Age: 80
LOS: 10 days | Discharge: NURSING, REHAB OR LONG-TERM CARE | DRG: 698 | End: 2020-02-09
Attending: EMERGENCY MEDICINE | Admitting: INTERNAL MEDICINE
Payer: MEDICARE

## 2020-01-01 ENCOUNTER — APPOINTMENT (OUTPATIENT)
Dept: GENERAL RADIOLOGY | Age: 80
DRG: 698 | End: 2020-01-01
Payer: MEDICARE

## 2020-01-01 ENCOUNTER — TELEPHONE (OUTPATIENT)
Dept: ORTHOPEDIC SURGERY | Age: 80
End: 2020-01-01

## 2020-01-01 VITALS
HEIGHT: 68 IN | WEIGHT: 195.9 LBS | SYSTOLIC BLOOD PRESSURE: 130 MMHG | DIASTOLIC BLOOD PRESSURE: 100 MMHG | RESPIRATION RATE: 18 BRPM | OXYGEN SATURATION: 100 % | TEMPERATURE: 98.4 F | BODY MASS INDEX: 29.69 KG/M2 | HEART RATE: 84 BPM

## 2020-01-01 VITALS
OXYGEN SATURATION: 98 % | DIASTOLIC BLOOD PRESSURE: 75 MMHG | HEIGHT: 68 IN | RESPIRATION RATE: 18 BRPM | BODY MASS INDEX: 27.89 KG/M2 | SYSTOLIC BLOOD PRESSURE: 134 MMHG | TEMPERATURE: 98.3 F | HEART RATE: 76 BPM | WEIGHT: 184 LBS

## 2020-01-01 VITALS — BODY MASS INDEX: 28.04 KG/M2 | RESPIRATION RATE: 16 BRPM | WEIGHT: 185 LBS | HEIGHT: 68 IN

## 2020-01-01 VITALS
SYSTOLIC BLOOD PRESSURE: 120 MMHG | WEIGHT: 195 LBS | BODY MASS INDEX: 28.88 KG/M2 | DIASTOLIC BLOOD PRESSURE: 82 MMHG | HEART RATE: 70 BPM | HEIGHT: 69 IN

## 2020-01-01 VITALS
HEART RATE: 84 BPM | BODY MASS INDEX: 28.9 KG/M2 | RESPIRATION RATE: 14 BRPM | OXYGEN SATURATION: 92 % | WEIGHT: 195.11 LBS | HEIGHT: 69 IN

## 2020-01-01 LAB
ALBUMIN SERPL-MCNC: 3.1 GM/DL (ref 3.4–5)
ALBUMIN SERPL-MCNC: 3.3 GM/DL (ref 3.4–5)
ALP BLD-CCNC: 100 IU/L (ref 40–129)
ALP BLD-CCNC: 89 IU/L (ref 40–128)
ALT SERPL-CCNC: 6 U/L (ref 10–40)
ALT SERPL-CCNC: 7 U/L (ref 10–40)
ANION GAP SERPL CALCULATED.3IONS-SCNC: 13 MMOL/L (ref 4–16)
ANION GAP SERPL CALCULATED.3IONS-SCNC: 13 MMOL/L (ref 4–16)
ANION GAP SERPL CALCULATED.3IONS-SCNC: 14 MMOL/L (ref 4–16)
ANION GAP SERPL CALCULATED.3IONS-SCNC: 15 MMOL/L (ref 4–16)
ANION GAP SERPL CALCULATED.3IONS-SCNC: 16 MMOL/L (ref 4–16)
AST SERPL-CCNC: 10 IU/L (ref 15–37)
AST SERPL-CCNC: 9 IU/L (ref 15–37)
BACTERIA: ABNORMAL /HPF
BACTERIA: NEGATIVE /HPF
BASE EXCESS MIXED: ABNORMAL (ref 0–2.3)
BASOPHILS ABSOLUTE: 0 K/CU MM
BASOPHILS ABSOLUTE: 0.1 K/CU MM
BASOPHILS RELATIVE PERCENT: 0.1 % (ref 0–1)
BASOPHILS RELATIVE PERCENT: 0.2 % (ref 0–1)
BASOPHILS RELATIVE PERCENT: 0.2 % (ref 0–1)
BASOPHILS RELATIVE PERCENT: 0.3 % (ref 0–1)
BASOPHILS RELATIVE PERCENT: 0.4 % (ref 0–1)
BILIRUB SERPL-MCNC: 0.5 MG/DL (ref 0–1)
BILIRUB SERPL-MCNC: 0.5 MG/DL (ref 0–1)
BILIRUBIN URINE: ABNORMAL MG/DL
BILIRUBIN URINE: NEGATIVE MG/DL
BLOOD, URINE: ABNORMAL
BLOOD, URINE: NEGATIVE
BUN BLDV-MCNC: 23 MG/DL (ref 6–23)
BUN BLDV-MCNC: 24 MG/DL (ref 6–23)
BUN BLDV-MCNC: 26 MG/DL (ref 6–23)
BUN BLDV-MCNC: 28 MG/DL (ref 6–23)
BUN BLDV-MCNC: 31 MG/DL (ref 6–23)
BUN BLDV-MCNC: 33 MG/DL (ref 6–23)
BUN BLDV-MCNC: 36 MG/DL (ref 6–23)
BUN BLDV-MCNC: 40 MG/DL (ref 6–23)
BUN BLDV-MCNC: 46 MG/DL (ref 6–23)
BUN BLDV-MCNC: 46 MG/DL (ref 6–23)
CALCIUM SERPL-MCNC: 7.6 MG/DL (ref 8.3–10.6)
CALCIUM SERPL-MCNC: 7.7 MG/DL (ref 8.3–10.6)
CALCIUM SERPL-MCNC: 7.8 MG/DL (ref 8.3–10.6)
CALCIUM SERPL-MCNC: 7.9 MG/DL (ref 8.3–10.6)
CALCIUM SERPL-MCNC: 8 MG/DL (ref 8.3–10.6)
CALCIUM SERPL-MCNC: 8.1 MG/DL (ref 8.3–10.6)
CALCIUM SERPL-MCNC: 8.2 MG/DL (ref 8.3–10.6)
CALCIUM SERPL-MCNC: 8.3 MG/DL (ref 8.3–10.6)
CHLORIDE BLD-SCNC: 100 MMOL/L (ref 99–110)
CHLORIDE BLD-SCNC: 102 MMOL/L (ref 99–110)
CHLORIDE BLD-SCNC: 106 MMOL/L (ref 99–110)
CHLORIDE BLD-SCNC: 109 MMOL/L (ref 99–110)
CHLORIDE BLD-SCNC: 109 MMOL/L (ref 99–110)
CHLORIDE BLD-SCNC: 111 MMOL/L (ref 99–110)
CHLORIDE BLD-SCNC: 95 MMOL/L (ref 99–110)
CHLORIDE BLD-SCNC: 99 MMOL/L (ref 99–110)
CLARITY: ABNORMAL
CLARITY: ABNORMAL
CO2: 17 MMOL/L (ref 21–32)
CO2: 17 MMOL/L (ref 21–32)
CO2: 18 MMOL/L (ref 21–32)
CO2: 18 MMOL/L (ref 21–32)
CO2: 19 MMOL/L (ref 21–32)
CO2: 20 MMOL/L (ref 21–32)
CO2: 21 MMOL/L (ref 21–32)
CO2: 24 MMOL/L (ref 21–32)
COLOR: ABNORMAL
COLOR: YELLOW
COMMENT: ABNORMAL
CREAT SERPL-MCNC: 0.8 MG/DL (ref 0.6–1.1)
CREAT SERPL-MCNC: 1 MG/DL (ref 0.6–1.1)
CREAT SERPL-MCNC: 1 MG/DL (ref 0.6–1.1)
CREAT SERPL-MCNC: 1.1 MG/DL (ref 0.6–1.1)
CREAT SERPL-MCNC: 1.1 MG/DL (ref 0.6–1.1)
CREAT SERPL-MCNC: 1.2 MG/DL (ref 0.6–1.1)
CREAT SERPL-MCNC: 1.3 MG/DL (ref 0.6–1.1)
CREAT SERPL-MCNC: 1.4 MG/DL (ref 0.6–1.1)
CULTURE: ABNORMAL
CULTURE: NORMAL
CULTURE: NORMAL
DIFFERENTIAL TYPE: ABNORMAL
DOSE AMOUNT: ABNORMAL
DOSE AMOUNT: NORMAL
DOSE TIME: ABNORMAL
DOSE TIME: NORMAL
EKG DIAGNOSIS: NORMAL
EKG Q-T INTERVAL: 430 MS
EKG QRS DURATION: 70 MS
EKG QTC CALCULATION (BAZETT): 443 MS
EKG R AXIS: -10 DEGREES
EKG T AXIS: 32 DEGREES
EKG VENTRICULAR RATE: 64 BPM
EOSINOPHILS ABSOLUTE: 0 K/CU MM
EOSINOPHILS ABSOLUTE: 0.1 K/CU MM
EOSINOPHILS ABSOLUTE: 0.2 K/CU MM
EOSINOPHILS ABSOLUTE: 0.4 K/CU MM
EOSINOPHILS ABSOLUTE: 0.4 K/CU MM
EOSINOPHILS RELATIVE PERCENT: 0.3 % (ref 0–3)
EOSINOPHILS RELATIVE PERCENT: 0.5 % (ref 0–3)
EOSINOPHILS RELATIVE PERCENT: 1.3 % (ref 0–3)
EOSINOPHILS RELATIVE PERCENT: 1.4 % (ref 0–3)
EOSINOPHILS RELATIVE PERCENT: 1.7 % (ref 0–3)
EOSINOPHILS RELATIVE PERCENT: 1.7 % (ref 0–3)
EOSINOPHILS RELATIVE PERCENT: 1.8 % (ref 0–3)
EOSINOPHILS RELATIVE PERCENT: 2 % (ref 0–3)
EOSINOPHILS RELATIVE PERCENT: 2.9 % (ref 0–3)
EOSINOPHILS RELATIVE PERCENT: 2.9 % (ref 0–3)
GFR AFRICAN AMERICAN: 44 ML/MIN/1.73M2
GFR AFRICAN AMERICAN: 48 ML/MIN/1.73M2
GFR AFRICAN AMERICAN: 52 ML/MIN/1.73M2
GFR AFRICAN AMERICAN: 58 ML/MIN/1.73M2
GFR AFRICAN AMERICAN: 58 ML/MIN/1.73M2
GFR AFRICAN AMERICAN: >60 ML/MIN/1.73M2
GFR NON-AFRICAN AMERICAN: 36 ML/MIN/1.73M2
GFR NON-AFRICAN AMERICAN: 39 ML/MIN/1.73M2
GFR NON-AFRICAN AMERICAN: 43 ML/MIN/1.73M2
GFR NON-AFRICAN AMERICAN: 48 ML/MIN/1.73M2
GFR NON-AFRICAN AMERICAN: 48 ML/MIN/1.73M2
GFR NON-AFRICAN AMERICAN: 53 ML/MIN/1.73M2
GFR NON-AFRICAN AMERICAN: 53 ML/MIN/1.73M2
GFR NON-AFRICAN AMERICAN: >60 ML/MIN/1.73M2
GLUCOSE BLD-MCNC: 100 MG/DL (ref 70–99)
GLUCOSE BLD-MCNC: 100 MG/DL (ref 70–99)
GLUCOSE BLD-MCNC: 101 MG/DL (ref 70–99)
GLUCOSE BLD-MCNC: 102 MG/DL (ref 70–99)
GLUCOSE BLD-MCNC: 104 MG/DL (ref 70–99)
GLUCOSE BLD-MCNC: 104 MG/DL (ref 70–99)
GLUCOSE BLD-MCNC: 108 MG/DL (ref 70–99)
GLUCOSE BLD-MCNC: 109 MG/DL (ref 70–99)
GLUCOSE BLD-MCNC: 111 MG/DL (ref 70–99)
GLUCOSE BLD-MCNC: 113 MG/DL (ref 70–99)
GLUCOSE BLD-MCNC: 115 MG/DL (ref 70–99)
GLUCOSE BLD-MCNC: 116 MG/DL (ref 70–99)
GLUCOSE BLD-MCNC: 119 MG/DL (ref 70–99)
GLUCOSE BLD-MCNC: 120 MG/DL (ref 70–99)
GLUCOSE BLD-MCNC: 121 MG/DL (ref 70–99)
GLUCOSE BLD-MCNC: 121 MG/DL (ref 70–99)
GLUCOSE BLD-MCNC: 123 MG/DL (ref 70–99)
GLUCOSE BLD-MCNC: 125 MG/DL (ref 70–99)
GLUCOSE BLD-MCNC: 125 MG/DL (ref 70–99)
GLUCOSE BLD-MCNC: 126 MG/DL (ref 70–99)
GLUCOSE BLD-MCNC: 128 MG/DL (ref 70–99)
GLUCOSE BLD-MCNC: 128 MG/DL (ref 70–99)
GLUCOSE BLD-MCNC: 132 MG/DL (ref 70–99)
GLUCOSE BLD-MCNC: 134 MG/DL (ref 70–99)
GLUCOSE BLD-MCNC: 138 MG/DL (ref 70–99)
GLUCOSE BLD-MCNC: 142 MG/DL (ref 70–99)
GLUCOSE BLD-MCNC: 142 MG/DL (ref 70–99)
GLUCOSE BLD-MCNC: 147 MG/DL (ref 70–99)
GLUCOSE BLD-MCNC: 153 MG/DL (ref 70–99)
GLUCOSE BLD-MCNC: 156 MG/DL (ref 70–99)
GLUCOSE BLD-MCNC: 65 MG/DL (ref 70–99)
GLUCOSE BLD-MCNC: 69 MG/DL (ref 70–99)
GLUCOSE BLD-MCNC: 71 MG/DL (ref 70–99)
GLUCOSE BLD-MCNC: 71 MG/DL (ref 70–99)
GLUCOSE BLD-MCNC: 72 MG/DL (ref 70–99)
GLUCOSE BLD-MCNC: 76 MG/DL (ref 70–99)
GLUCOSE BLD-MCNC: 85 MG/DL (ref 70–99)
GLUCOSE BLD-MCNC: 86 MG/DL (ref 70–99)
GLUCOSE BLD-MCNC: 88 MG/DL (ref 70–99)
GLUCOSE BLD-MCNC: 91 MG/DL (ref 70–99)
GLUCOSE BLD-MCNC: 92 MG/DL (ref 70–99)
GLUCOSE BLD-MCNC: 92 MG/DL (ref 70–99)
GLUCOSE BLD-MCNC: 93 MG/DL (ref 70–99)
GLUCOSE BLD-MCNC: 96 MG/DL (ref 70–99)
GLUCOSE BLD-MCNC: 97 MG/DL (ref 70–99)
GLUCOSE BLD-MCNC: 97 MG/DL (ref 70–99)
GLUCOSE, URINE: NEGATIVE MG/DL
GLUCOSE, URINE: NEGATIVE MG/DL
HCO3 VENOUS: 28.7 MMOL/L (ref 19–25)
HCT VFR BLD CALC: 29.8 % (ref 37–47)
HCT VFR BLD CALC: 31.3 % (ref 37–47)
HCT VFR BLD CALC: 31.6 % (ref 37–47)
HCT VFR BLD CALC: 32.8 % (ref 37–47)
HCT VFR BLD CALC: 32.9 % (ref 37–47)
HCT VFR BLD CALC: 33.4 % (ref 37–47)
HCT VFR BLD CALC: 34 % (ref 37–47)
HCT VFR BLD CALC: 34.2 % (ref 37–47)
HCT VFR BLD CALC: 34.9 % (ref 37–47)
HCT VFR BLD CALC: 35.6 % (ref 37–47)
HCT VFR BLD CALC: 36.5 % (ref 37–47)
HEMOGLOBIN: 10.1 GM/DL (ref 12.5–16)
HEMOGLOBIN: 10.7 GM/DL (ref 12.5–16)
HEMOGLOBIN: 8.8 GM/DL (ref 12.5–16)
HEMOGLOBIN: 9.3 GM/DL (ref 12.5–16)
HEMOGLOBIN: 9.4 GM/DL (ref 12.5–16)
HEMOGLOBIN: 9.5 GM/DL (ref 12.5–16)
HEMOGLOBIN: 9.8 GM/DL (ref 12.5–16)
HEMOGLOBIN: 9.9 GM/DL (ref 12.5–16)
HEMOGLOBIN: 9.9 GM/DL (ref 12.5–16)
HIGH SENSITIVE C-REACTIVE PROTEIN: 14.6 MG/L
HIGH SENSITIVE C-REACTIVE PROTEIN: 16.2 MG/L
HIGH SENSITIVE C-REACTIVE PROTEIN: 17.8 MG/L
HIGH SENSITIVE C-REACTIVE PROTEIN: 22.1 MG/L
HYALINE CASTS: 5 /LPF
ICTOTEST: NEGATIVE
IMMATURE NEUTROPHIL %: 0.3 % (ref 0–0.43)
IMMATURE NEUTROPHIL %: 0.5 % (ref 0–0.43)
IMMATURE NEUTROPHIL %: 0.6 % (ref 0–0.43)
IMMATURE NEUTROPHIL %: 0.7 % (ref 0–0.43)
IMMATURE NEUTROPHIL %: 0.7 % (ref 0–0.43)
IMMATURE NEUTROPHIL %: 0.8 % (ref 0–0.43)
IMMATURE NEUTROPHIL %: 1 % (ref 0–0.43)
KETONES, URINE: NEGATIVE MG/DL
KETONES, URINE: NEGATIVE MG/DL
LACTATE: 0.9 MMOL/L (ref 0.4–2)
LACTATE: 1.4 MMOL/L (ref 0.4–2)
LEUKOCYTE ESTERASE, URINE: ABNORMAL
LEUKOCYTE ESTERASE, URINE: ABNORMAL
LV EF: 63 %
LVEF MODALITY: NORMAL
LYMPHOCYTES ABSOLUTE: 0.4 K/CU MM
LYMPHOCYTES ABSOLUTE: 0.5 K/CU MM
LYMPHOCYTES ABSOLUTE: 0.5 K/CU MM
LYMPHOCYTES ABSOLUTE: 0.7 K/CU MM
LYMPHOCYTES ABSOLUTE: 0.8 K/CU MM
LYMPHOCYTES ABSOLUTE: 0.9 K/CU MM
LYMPHOCYTES RELATIVE PERCENT: 2.7 % (ref 24–44)
LYMPHOCYTES RELATIVE PERCENT: 3.2 % (ref 24–44)
LYMPHOCYTES RELATIVE PERCENT: 4.6 % (ref 24–44)
LYMPHOCYTES RELATIVE PERCENT: 5.4 % (ref 24–44)
LYMPHOCYTES RELATIVE PERCENT: 5.7 % (ref 24–44)
LYMPHOCYTES RELATIVE PERCENT: 5.9 % (ref 24–44)
LYMPHOCYTES RELATIVE PERCENT: 6.1 % (ref 24–44)
LYMPHOCYTES RELATIVE PERCENT: 7.7 % (ref 24–44)
LYMPHOCYTES RELATIVE PERCENT: 7.8 % (ref 24–44)
LYMPHOCYTES RELATIVE PERCENT: 8.3 % (ref 24–44)
Lab: ABNORMAL
Lab: ABNORMAL
Lab: NORMAL
Lab: NORMAL
MAGNESIUM: 1.6 MG/DL (ref 1.8–2.4)
MAGNESIUM: 1.9 MG/DL (ref 1.8–2.4)
MAGNESIUM: 2 MG/DL (ref 1.8–2.4)
MAGNESIUM: 2.1 MG/DL (ref 1.8–2.4)
MAGNESIUM: 2.1 MG/DL (ref 1.8–2.4)
MAGNESIUM: 2.3 MG/DL (ref 1.8–2.4)
MAGNESIUM: 2.3 MG/DL (ref 1.8–2.4)
MCH RBC QN AUTO: 29.1 PG (ref 27–31)
MCH RBC QN AUTO: 29.3 PG (ref 27–31)
MCH RBC QN AUTO: 29.5 PG (ref 27–31)
MCH RBC QN AUTO: 29.6 PG (ref 27–31)
MCH RBC QN AUTO: 29.6 PG (ref 27–31)
MCH RBC QN AUTO: 29.8 PG (ref 27–31)
MCH RBC QN AUTO: 29.9 PG (ref 27–31)
MCH RBC QN AUTO: 29.9 PG (ref 27–31)
MCH RBC QN AUTO: 30 PG (ref 27–31)
MCH RBC QN AUTO: 30.1 PG (ref 27–31)
MCHC RBC AUTO-ENTMCNC: 27.2 % (ref 32–36)
MCHC RBC AUTO-ENTMCNC: 28.4 % (ref 32–36)
MCHC RBC AUTO-ENTMCNC: 28.8 % (ref 32–36)
MCHC RBC AUTO-ENTMCNC: 28.9 % (ref 32–36)
MCHC RBC AUTO-ENTMCNC: 29.3 % (ref 32–36)
MCHC RBC AUTO-ENTMCNC: 29.5 % (ref 32–36)
MCHC RBC AUTO-ENTMCNC: 29.6 % (ref 32–36)
MCHC RBC AUTO-ENTMCNC: 29.7 % (ref 32–36)
MCHC RBC AUTO-ENTMCNC: 29.7 % (ref 32–36)
MCHC RBC AUTO-ENTMCNC: 29.8 % (ref 32–36)
MCV RBC AUTO: 100 FL (ref 78–100)
MCV RBC AUTO: 100.8 FL (ref 78–100)
MCV RBC AUTO: 101.4 FL (ref 78–100)
MCV RBC AUTO: 103.6 FL (ref 78–100)
MCV RBC AUTO: 104.3 FL (ref 78–100)
MCV RBC AUTO: 105 FL (ref 78–100)
MCV RBC AUTO: 109.7 FL (ref 78–100)
MCV RBC AUTO: 97.8 FL (ref 78–100)
MCV RBC AUTO: 98.7 FL (ref 78–100)
MCV RBC AUTO: 99.1 FL (ref 78–100)
MONOCYTES ABSOLUTE: 0.5 K/CU MM
MONOCYTES ABSOLUTE: 0.6 K/CU MM
MONOCYTES ABSOLUTE: 0.7 K/CU MM
MONOCYTES ABSOLUTE: 0.8 K/CU MM
MONOCYTES ABSOLUTE: 0.8 K/CU MM
MONOCYTES ABSOLUTE: 0.9 K/CU MM
MONOCYTES ABSOLUTE: 1 K/CU MM
MONOCYTES ABSOLUTE: 1.1 K/CU MM
MONOCYTES RELATIVE PERCENT: 10.5 % (ref 0–4)
MONOCYTES RELATIVE PERCENT: 3.9 % (ref 0–4)
MONOCYTES RELATIVE PERCENT: 5.9 % (ref 0–4)
MONOCYTES RELATIVE PERCENT: 5.9 % (ref 0–4)
MONOCYTES RELATIVE PERCENT: 6.1 % (ref 0–4)
MONOCYTES RELATIVE PERCENT: 7.1 % (ref 0–4)
MONOCYTES RELATIVE PERCENT: 7.7 % (ref 0–4)
MONOCYTES RELATIVE PERCENT: 7.9 % (ref 0–4)
MONOCYTES RELATIVE PERCENT: 8.6 % (ref 0–4)
MONOCYTES RELATIVE PERCENT: 9.1 % (ref 0–4)
MUCUS: ABNORMAL HPF
MUCUS: ABNORMAL HPF
NITRITE URINE, QUANTITATIVE: NEGATIVE
NITRITE URINE, QUANTITATIVE: NEGATIVE
NON SQUAM EPI CELLS: <1 /HPF
NUCLEATED RBC %: 0 %
O2 SAT, VEN: 92.7 % (ref 50–70)
PCO2, VEN: 61 MMHG (ref 38–52)
PDW BLD-RTO: 13.3 % (ref 11.7–14.9)
PDW BLD-RTO: 13.3 % (ref 11.7–14.9)
PDW BLD-RTO: 13.7 % (ref 11.7–14.9)
PDW BLD-RTO: 13.7 % (ref 11.7–14.9)
PDW BLD-RTO: 13.9 % (ref 11.7–14.9)
PDW BLD-RTO: 13.9 % (ref 11.7–14.9)
PDW BLD-RTO: 14.6 % (ref 11.7–14.9)
PDW BLD-RTO: 15.3 % (ref 11.7–14.9)
PDW BLD-RTO: 15.4 % (ref 11.7–14.9)
PDW BLD-RTO: 15.5 % (ref 11.7–14.9)
PH VENOUS: 7.28 (ref 7.32–7.42)
PH, URINE: 5 (ref 5–8)
PH, URINE: 7 (ref 5–8)
PLATELET # BLD: 276 K/CU MM (ref 140–440)
PLATELET # BLD: 280 K/CU MM (ref 140–440)
PLATELET # BLD: 320 K/CU MM (ref 140–440)
PLATELET # BLD: 329 K/CU MM (ref 140–440)
PLATELET # BLD: 338 K/CU MM (ref 140–440)
PLATELET # BLD: 344 K/CU MM (ref 140–440)
PLATELET # BLD: 372 K/CU MM (ref 140–440)
PLATELET # BLD: 391 K/CU MM (ref 140–440)
PLATELET # BLD: 420 K/CU MM (ref 140–440)
PLATELET # BLD: 451 K/CU MM (ref 140–440)
PMV BLD AUTO: 10 FL (ref 7.5–11.1)
PMV BLD AUTO: 10 FL (ref 7.5–11.1)
PMV BLD AUTO: 10.1 FL (ref 7.5–11.1)
PMV BLD AUTO: 9.4 FL (ref 7.5–11.1)
PMV BLD AUTO: 9.6 FL (ref 7.5–11.1)
PMV BLD AUTO: 9.7 FL (ref 7.5–11.1)
PMV BLD AUTO: 9.8 FL (ref 7.5–11.1)
PMV BLD AUTO: 9.8 FL (ref 7.5–11.1)
PMV BLD AUTO: 9.9 FL (ref 7.5–11.1)
PMV BLD AUTO: 9.9 FL (ref 7.5–11.1)
PO2, VEN: 75 MMHG (ref 28–48)
POTASSIUM SERPL-SCNC: 3.6 MMOL/L (ref 3.5–5.1)
POTASSIUM SERPL-SCNC: 3.7 MMOL/L (ref 3.5–5.1)
POTASSIUM SERPL-SCNC: 3.8 MMOL/L (ref 3.5–5.1)
POTASSIUM SERPL-SCNC: 3.9 MMOL/L (ref 3.5–5.1)
POTASSIUM SERPL-SCNC: 3.9 MMOL/L (ref 3.5–5.1)
POTASSIUM SERPL-SCNC: 4 MMOL/L (ref 3.5–5.1)
POTASSIUM SERPL-SCNC: 4.1 MMOL/L (ref 3.5–5.1)
PRO-BNP: 3668 PG/ML
PROCALCITONIN: 0.14
PROTEIN UA: 100 MG/DL
PROTEIN UA: NEGATIVE MG/DL
RBC # BLD: 2.94 M/CU MM (ref 4.2–5.4)
RBC # BLD: 3.17 M/CU MM (ref 4.2–5.4)
RBC # BLD: 3.18 M/CU MM (ref 4.2–5.4)
RBC # BLD: 3.23 M/CU MM (ref 4.2–5.4)
RBC # BLD: 3.26 M/CU MM (ref 4.2–5.4)
RBC # BLD: 3.3 M/CU MM (ref 4.2–5.4)
RBC # BLD: 3.32 M/CU MM (ref 4.2–5.4)
RBC # BLD: 3.34 M/CU MM (ref 4.2–5.4)
RBC # BLD: 3.39 M/CU MM (ref 4.2–5.4)
RBC # BLD: 3.62 M/CU MM (ref 4.2–5.4)
RBC URINE: 24 /HPF (ref 0–6)
RBC URINE: ABNORMAL /HPF (ref 0–6)
REASON FOR REJECTION: NORMAL
REASON FOR REJECTION: NORMAL
REJECTED TEST: NORMAL
SEGMENTED NEUTROPHILS ABSOLUTE COUNT: 10.2 K/CU MM
SEGMENTED NEUTROPHILS ABSOLUTE COUNT: 10.3 K/CU MM
SEGMENTED NEUTROPHILS ABSOLUTE COUNT: 11.4 K/CU MM
SEGMENTED NEUTROPHILS ABSOLUTE COUNT: 12.6 K/CU MM
SEGMENTED NEUTROPHILS ABSOLUTE COUNT: 15 K/CU MM
SEGMENTED NEUTROPHILS ABSOLUTE COUNT: 6.9 K/CU MM
SEGMENTED NEUTROPHILS ABSOLUTE COUNT: 7 K/CU MM
SEGMENTED NEUTROPHILS ABSOLUTE COUNT: 7.7 K/CU MM
SEGMENTED NEUTROPHILS ABSOLUTE COUNT: 8.1 K/CU MM
SEGMENTED NEUTROPHILS ABSOLUTE COUNT: 9.3 K/CU MM
SEGMENTED NEUTROPHILS RELATIVE PERCENT: 78.7 % (ref 36–66)
SEGMENTED NEUTROPHILS RELATIVE PERCENT: 80.8 % (ref 36–66)
SEGMENTED NEUTROPHILS RELATIVE PERCENT: 81.1 % (ref 36–66)
SEGMENTED NEUTROPHILS RELATIVE PERCENT: 82.4 % (ref 36–66)
SEGMENTED NEUTROPHILS RELATIVE PERCENT: 84.2 % (ref 36–66)
SEGMENTED NEUTROPHILS RELATIVE PERCENT: 84.3 % (ref 36–66)
SEGMENTED NEUTROPHILS RELATIVE PERCENT: 85.6 % (ref 36–66)
SEGMENTED NEUTROPHILS RELATIVE PERCENT: 86.6 % (ref 36–66)
SEGMENTED NEUTROPHILS RELATIVE PERCENT: 89.5 % (ref 36–66)
SEGMENTED NEUTROPHILS RELATIVE PERCENT: 91.7 % (ref 36–66)
SODIUM BLD-SCNC: 133 MMOL/L (ref 135–145)
SODIUM BLD-SCNC: 133 MMOL/L (ref 135–145)
SODIUM BLD-SCNC: 135 MMOL/L (ref 135–145)
SODIUM BLD-SCNC: 137 MMOL/L (ref 135–145)
SODIUM BLD-SCNC: 137 MMOL/L (ref 135–145)
SODIUM BLD-SCNC: 139 MMOL/L (ref 135–145)
SODIUM BLD-SCNC: 140 MMOL/L (ref 135–145)
SODIUM BLD-SCNC: 140 MMOL/L (ref 135–145)
SODIUM BLD-SCNC: 141 MMOL/L (ref 135–145)
SODIUM BLD-SCNC: 141 MMOL/L (ref 135–145)
SPECIFIC GRAVITY UA: 1.01 (ref 1–1.03)
SPECIFIC GRAVITY UA: 1.02 (ref 1–1.03)
SPECIMEN: ABNORMAL
SPECIMEN: ABNORMAL
SPECIMEN: NORMAL
SPECIMEN: NORMAL
SQUAMOUS EPITHELIAL: 9 /HPF
TOTAL COLONY COUNT: ABNORMAL
TOTAL IMMATURE NEUTOROPHIL: 0.03 K/CU MM
TOTAL IMMATURE NEUTOROPHIL: 0.04 K/CU MM
TOTAL IMMATURE NEUTOROPHIL: 0.05 K/CU MM
TOTAL IMMATURE NEUTOROPHIL: 0.06 K/CU MM
TOTAL IMMATURE NEUTOROPHIL: 0.07 K/CU MM
TOTAL IMMATURE NEUTOROPHIL: 0.08 K/CU MM
TOTAL IMMATURE NEUTOROPHIL: 0.09 K/CU MM
TOTAL IMMATURE NEUTOROPHIL: 0.09 K/CU MM
TOTAL IMMATURE NEUTOROPHIL: 0.11 K/CU MM
TOTAL IMMATURE NEUTOROPHIL: 0.17 K/CU MM
TOTAL NUCLEATED RBC: 0 K/CU MM
TOTAL PROTEIN: 5.5 GM/DL (ref 6.4–8.2)
TOTAL PROTEIN: 6 GM/DL (ref 6.4–8.2)
TOTAL RETICULOCYTE COUNT: 0.08 K/CU MM
TRICHOMONAS: ABNORMAL /HPF
TRICHOMONAS: ABNORMAL /HPF
TROPONIN T: 0.01 NG/ML
UROBILINOGEN, URINE: 1 MG/DL (ref 0.2–1)
UROBILINOGEN, URINE: NORMAL MG/DL (ref 0.2–1)
VANCOMYCIN TROUGH: 15.9 UG/ML (ref 10–20)
VANCOMYCIN TROUGH: 26.7 UG/ML (ref 10–20)
WBC # BLD: 10.1 K/CU MM (ref 4–10.5)
WBC # BLD: 11.1 K/CU MM (ref 4–10.5)
WBC # BLD: 11.5 K/CU MM (ref 4–10.5)
WBC # BLD: 12.2 K/CU MM (ref 4–10.5)
WBC # BLD: 13.9 K/CU MM (ref 4–10.5)
WBC # BLD: 14.7 K/CU MM (ref 4–10.5)
WBC # BLD: 16.4 K/CU MM (ref 4–10.5)
WBC # BLD: 8.6 K/CU MM (ref 4–10.5)
WBC # BLD: 8.8 K/CU MM (ref 4–10.5)
WBC # BLD: 8.9 K/CU MM (ref 4–10.5)
WBC CLUMP: ABNORMAL /HPF
WBC UA: 66 /HPF (ref 0–5)
WBC UA: 71 /HPF (ref 0–5)
YEAST: ABNORMAL /HPF

## 2020-01-01 PROCEDURE — 6370000000 HC RX 637 (ALT 250 FOR IP): Performed by: NURSE PRACTITIONER

## 2020-01-01 PROCEDURE — 2140000000 HC CCU INTERMEDIATE R&B

## 2020-01-01 PROCEDURE — 1036F TOBACCO NON-USER: CPT | Performed by: INTERNAL MEDICINE

## 2020-01-01 PROCEDURE — 80048 BASIC METABOLIC PNL TOTAL CA: CPT

## 2020-01-01 PROCEDURE — 1111F DSCHRG MED/CURRENT MED MERGE: CPT | Performed by: INTERNAL MEDICINE

## 2020-01-01 PROCEDURE — 82805 BLOOD GASES W/O2 SATURATION: CPT

## 2020-01-01 PROCEDURE — 86141 C-REACTIVE PROTEIN HS: CPT

## 2020-01-01 PROCEDURE — 99232 SBSQ HOSP IP/OBS MODERATE 35: CPT | Performed by: INTERNAL MEDICINE

## 2020-01-01 PROCEDURE — 6360000002 HC RX W HCPCS: Performed by: INTERNAL MEDICINE

## 2020-01-01 PROCEDURE — 85025 COMPLETE CBC W/AUTO DIFF WBC: CPT

## 2020-01-01 PROCEDURE — G8400 PT W/DXA NO RESULTS DOC: HCPCS | Performed by: INTERNAL MEDICINE

## 2020-01-01 PROCEDURE — 99222 1ST HOSP IP/OBS MODERATE 55: CPT | Performed by: OBSTETRICS & GYNECOLOGY

## 2020-01-01 PROCEDURE — 6370000000 HC RX 637 (ALT 250 FOR IP): Performed by: INTERNAL MEDICINE

## 2020-01-01 PROCEDURE — APPSS45 APP SPLIT SHARED TIME 31-45 MINUTES: Performed by: NURSE PRACTITIONER

## 2020-01-01 PROCEDURE — 94640 AIRWAY INHALATION TREATMENT: CPT

## 2020-01-01 PROCEDURE — 2580000003 HC RX 258: Performed by: INTERNAL MEDICINE

## 2020-01-01 PROCEDURE — 99024 POSTOP FOLLOW-UP VISIT: CPT | Performed by: ORTHOPAEDIC SURGERY

## 2020-01-01 PROCEDURE — 74176 CT ABD & PELVIS W/O CONTRAST: CPT

## 2020-01-01 PROCEDURE — 36415 COLL VENOUS BLD VENIPUNCTURE: CPT

## 2020-01-01 PROCEDURE — 92610 EVALUATE SWALLOWING FUNCTION: CPT

## 2020-01-01 PROCEDURE — 83880 ASSAY OF NATRIURETIC PEPTIDE: CPT

## 2020-01-01 PROCEDURE — 82962 GLUCOSE BLOOD TEST: CPT

## 2020-01-01 PROCEDURE — 6370000000 HC RX 637 (ALT 250 FOR IP): Performed by: PHYSICIAN ASSISTANT

## 2020-01-01 PROCEDURE — 94761 N-INVAS EAR/PLS OXIMETRY MLT: CPT

## 2020-01-01 PROCEDURE — 87073 CULTURE BACTERIA ANAEROBIC: CPT

## 2020-01-01 PROCEDURE — 84484 ASSAY OF TROPONIN QUANT: CPT

## 2020-01-01 PROCEDURE — 71045 X-RAY EXAM CHEST 1 VIEW: CPT

## 2020-01-01 PROCEDURE — 99233 SBSQ HOSP IP/OBS HIGH 50: CPT | Performed by: INTERNAL MEDICINE

## 2020-01-01 PROCEDURE — 84145 PROCALCITONIN (PCT): CPT

## 2020-01-01 PROCEDURE — 6360000002 HC RX W HCPCS: Performed by: PHYSICIAN ASSISTANT

## 2020-01-01 PROCEDURE — G8484 FLU IMMUNIZE NO ADMIN: HCPCS | Performed by: INTERNAL MEDICINE

## 2020-01-01 PROCEDURE — 87186 SC STD MICRODIL/AGAR DIL: CPT

## 2020-01-01 PROCEDURE — 87040 BLOOD CULTURE FOR BACTERIA: CPT

## 2020-01-01 PROCEDURE — 1090F PRES/ABSN URINE INCON ASSESS: CPT | Performed by: INTERNAL MEDICINE

## 2020-01-01 PROCEDURE — 83735 ASSAY OF MAGNESIUM: CPT

## 2020-01-01 PROCEDURE — 94664 DEMO&/EVAL PT USE INHALER: CPT

## 2020-01-01 PROCEDURE — 70551 MRI BRAIN STEM W/O DYE: CPT

## 2020-01-01 PROCEDURE — 2580000003 HC RX 258: Performed by: PHYSICIAN ASSISTANT

## 2020-01-01 PROCEDURE — 96365 THER/PROPH/DIAG IV INF INIT: CPT

## 2020-01-01 PROCEDURE — G8427 DOCREV CUR MEDS BY ELIG CLIN: HCPCS | Performed by: INTERNAL MEDICINE

## 2020-01-01 PROCEDURE — 80202 ASSAY OF VANCOMYCIN: CPT

## 2020-01-01 PROCEDURE — 70450 CT HEAD/BRAIN W/O DYE: CPT

## 2020-01-01 PROCEDURE — 87071 CULTURE AEROBIC QUANT OTHER: CPT

## 2020-01-01 PROCEDURE — 72125 CT NECK SPINE W/O DYE: CPT

## 2020-01-01 PROCEDURE — 99231 SBSQ HOSP IP/OBS SF/LOW 25: CPT | Performed by: INTERNAL MEDICINE

## 2020-01-01 PROCEDURE — 93306 TTE W/DOPPLER COMPLETE: CPT

## 2020-01-01 PROCEDURE — 87077 CULTURE AEROBIC IDENTIFY: CPT

## 2020-01-01 PROCEDURE — 85014 HEMATOCRIT: CPT

## 2020-01-01 PROCEDURE — 2500000003 HC RX 250 WO HCPCS: Performed by: INTERNAL MEDICINE

## 2020-01-01 PROCEDURE — 2580000003 HC RX 258: Performed by: EMERGENCY MEDICINE

## 2020-01-01 PROCEDURE — 96361 HYDRATE IV INFUSION ADD-ON: CPT

## 2020-01-01 PROCEDURE — 93005 ELECTROCARDIOGRAM TRACING: CPT | Performed by: PHYSICIAN ASSISTANT

## 2020-01-01 PROCEDURE — 4040F PNEUMOC VAC/ADMIN/RCVD: CPT | Performed by: INTERNAL MEDICINE

## 2020-01-01 PROCEDURE — 85018 HEMOGLOBIN: CPT

## 2020-01-01 PROCEDURE — 92526 ORAL FUNCTION THERAPY: CPT

## 2020-01-01 PROCEDURE — 99221 1ST HOSP IP/OBS SF/LOW 40: CPT | Performed by: INTERNAL MEDICINE

## 2020-01-01 PROCEDURE — APPSS60 APP SPLIT SHARED TIME 46-60 MINUTES: Performed by: NURSE PRACTITIONER

## 2020-01-01 PROCEDURE — 99213 OFFICE O/P EST LOW 20 MIN: CPT

## 2020-01-01 PROCEDURE — 81001 URINALYSIS AUTO W/SCOPE: CPT

## 2020-01-01 PROCEDURE — 96367 TX/PROPH/DG ADDL SEQ IV INF: CPT

## 2020-01-01 PROCEDURE — 87147 CULTURE TYPE IMMUNOLOGIC: CPT

## 2020-01-01 PROCEDURE — 83605 ASSAY OF LACTIC ACID: CPT

## 2020-01-01 PROCEDURE — 96366 THER/PROPH/DIAG IV INF ADDON: CPT

## 2020-01-01 PROCEDURE — 87086 URINE CULTURE/COLONY COUNT: CPT

## 2020-01-01 PROCEDURE — 84132 ASSAY OF SERUM POTASSIUM: CPT

## 2020-01-01 PROCEDURE — 80053 COMPREHEN METABOLIC PANEL: CPT

## 2020-01-01 PROCEDURE — 1123F ACP DISCUSS/DSCN MKR DOCD: CPT | Performed by: INTERNAL MEDICINE

## 2020-01-01 PROCEDURE — 99285 EMERGENCY DEPT VISIT HI MDM: CPT

## 2020-01-01 PROCEDURE — 99284 EMERGENCY DEPT VISIT MOD MDM: CPT

## 2020-01-01 PROCEDURE — G8417 CALC BMI ABV UP PARAM F/U: HCPCS | Performed by: INTERNAL MEDICINE

## 2020-01-01 PROCEDURE — 99213 OFFICE O/P EST LOW 20 MIN: CPT | Performed by: INTERNAL MEDICINE

## 2020-01-01 PROCEDURE — 93010 ELECTROCARDIOGRAM REPORT: CPT | Performed by: INTERNAL MEDICINE

## 2020-01-01 RX ORDER — ACETAMINOPHEN 325 MG/1
650 TABLET ORAL EVERY 4 HOURS PRN
Status: DISCONTINUED | OUTPATIENT
Start: 2020-01-01 | End: 2020-01-01 | Stop reason: HOSPADM

## 2020-01-01 RX ORDER — DOCUSATE SODIUM 100 MG/1
100 CAPSULE, LIQUID FILLED ORAL DAILY
Status: DISCONTINUED | OUTPATIENT
Start: 2020-01-01 | End: 2020-01-01 | Stop reason: HOSPADM

## 2020-01-01 RX ORDER — VANCOMYCIN HYDROCHLORIDE 1 G/200ML
1000 INJECTION, SOLUTION INTRAVENOUS EVERY 24 HOURS
Status: DISCONTINUED | OUTPATIENT
Start: 2020-01-01 | End: 2020-01-01

## 2020-01-01 RX ORDER — ACETAMINOPHEN 325 MG/1
650 TABLET ORAL EVERY 8 HOURS PRN
Status: DISCONTINUED | OUTPATIENT
Start: 2020-01-01 | End: 2020-01-01 | Stop reason: HOSPADM

## 2020-01-01 RX ORDER — SODIUM CHLORIDE 0.9 % (FLUSH) 0.9 %
10 SYRINGE (ML) INJECTION PRN
Status: DISCONTINUED | OUTPATIENT
Start: 2020-01-01 | End: 2020-01-01 | Stop reason: HOSPADM

## 2020-01-01 RX ORDER — FLUTICASONE PROPIONATE 110 UG/1
2 AEROSOL, METERED RESPIRATORY (INHALATION) 2 TIMES DAILY
Status: DISCONTINUED | OUTPATIENT
Start: 2020-01-01 | End: 2020-01-01

## 2020-01-01 RX ORDER — 0.9 % SODIUM CHLORIDE 0.9 %
250 INTRAVENOUS SOLUTION INTRAVENOUS ONCE
Status: DISCONTINUED | OUTPATIENT
Start: 2020-01-01 | End: 2020-01-01 | Stop reason: HOSPADM

## 2020-01-01 RX ORDER — DILTIAZEM HYDROCHLORIDE 120 MG/1
120 CAPSULE, COATED, EXTENDED RELEASE ORAL DAILY
Status: DISCONTINUED | OUTPATIENT
Start: 2020-01-01 | End: 2020-01-01 | Stop reason: HOSPADM

## 2020-01-01 RX ORDER — ALBUTEROL SULFATE 90 UG/1
2 AEROSOL, METERED RESPIRATORY (INHALATION) EVERY 6 HOURS PRN
Status: DISCONTINUED | OUTPATIENT
Start: 2020-01-01 | End: 2020-01-01 | Stop reason: HOSPADM

## 2020-01-01 RX ORDER — SODIUM CHLORIDE 9 MG/ML
INJECTION, SOLUTION INTRAVENOUS CONTINUOUS
Status: DISCONTINUED | OUTPATIENT
Start: 2020-01-01 | End: 2020-01-01

## 2020-01-01 RX ORDER — FLUTICASONE PROPIONATE 50 MCG
1 SPRAY, SUSPENSION (ML) NASAL DAILY
Status: DISCONTINUED | OUTPATIENT
Start: 2020-01-01 | End: 2020-01-01 | Stop reason: HOSPADM

## 2020-01-01 RX ORDER — MAGNESIUM SULFATE IN WATER 40 MG/ML
2 INJECTION, SOLUTION INTRAVENOUS ONCE
Status: COMPLETED | OUTPATIENT
Start: 2020-01-01 | End: 2020-01-01

## 2020-01-01 RX ORDER — NITROGLYCERIN 0.4 MG/1
0.4 TABLET SUBLINGUAL EVERY 5 MIN PRN
Status: DISCONTINUED | OUTPATIENT
Start: 2020-01-01 | End: 2020-01-01 | Stop reason: HOSPADM

## 2020-01-01 RX ORDER — SODIUM CHLORIDE 9 MG/ML
INJECTION, SOLUTION INTRAVENOUS CONTINUOUS
Status: DISCONTINUED | OUTPATIENT
Start: 2020-01-01 | End: 2020-01-01 | Stop reason: HOSPADM

## 2020-01-01 RX ORDER — ONDANSETRON 2 MG/ML
4 INJECTION INTRAMUSCULAR; INTRAVENOUS EVERY 6 HOURS PRN
Status: DISCONTINUED | OUTPATIENT
Start: 2020-01-01 | End: 2020-01-01 | Stop reason: HOSPADM

## 2020-01-01 RX ORDER — FESOTERODINE FUMARATE 8 MG/1
8 TABLET, EXTENDED RELEASE ORAL DAILY
Status: DISCONTINUED | OUTPATIENT
Start: 2020-01-01 | End: 2020-01-01 | Stop reason: CLARIF

## 2020-01-01 RX ORDER — DONEPEZIL HYDROCHLORIDE 10 MG/1
10 TABLET, FILM COATED ORAL NIGHTLY
Status: DISCONTINUED | OUTPATIENT
Start: 2020-01-01 | End: 2020-01-01 | Stop reason: HOSPADM

## 2020-01-01 RX ORDER — FLUTICASONE PROPIONATE 110 UG/1
2 AEROSOL, METERED RESPIRATORY (INHALATION) 2 TIMES DAILY PRN
Status: DISCONTINUED | OUTPATIENT
Start: 2020-01-01 | End: 2020-01-01 | Stop reason: HOSPADM

## 2020-01-01 RX ORDER — 0.9 % SODIUM CHLORIDE 0.9 %
1000 INTRAVENOUS SOLUTION INTRAVENOUS ONCE
Status: COMPLETED | OUTPATIENT
Start: 2020-01-01 | End: 2020-01-01

## 2020-01-01 RX ORDER — VANCOMYCIN HYDROCHLORIDE 1 G/200ML
1000 INJECTION, SOLUTION INTRAVENOUS ONCE
Status: COMPLETED | OUTPATIENT
Start: 2020-01-01 | End: 2020-01-01

## 2020-01-01 RX ORDER — TROSPIUM CHLORIDE 20 MG/1
20 TABLET, FILM COATED ORAL
Status: DISCONTINUED | OUTPATIENT
Start: 2020-01-01 | End: 2020-01-01 | Stop reason: HOSPADM

## 2020-01-01 RX ORDER — PAROXETINE HYDROCHLORIDE 20 MG/1
20 TABLET, FILM COATED ORAL EVERY MORNING
Status: DISCONTINUED | OUTPATIENT
Start: 2020-01-01 | End: 2020-01-01 | Stop reason: HOSPADM

## 2020-01-01 RX ORDER — SENNA PLUS 8.6 MG/1
1 TABLET ORAL 2 TIMES DAILY
Status: DISCONTINUED | OUTPATIENT
Start: 2020-01-01 | End: 2020-01-01 | Stop reason: HOSPADM

## 2020-01-01 RX ORDER — ATORVASTATIN CALCIUM 10 MG/1
10 TABLET, FILM COATED ORAL DAILY
Status: DISCONTINUED | OUTPATIENT
Start: 2020-01-01 | End: 2020-01-01 | Stop reason: HOSPADM

## 2020-01-01 RX ORDER — BUSPIRONE HYDROCHLORIDE 5 MG/1
5 TABLET ORAL 2 TIMES DAILY
Status: DISCONTINUED | OUTPATIENT
Start: 2020-01-01 | End: 2020-01-01 | Stop reason: HOSPADM

## 2020-01-01 RX ORDER — DEXTROSE MONOHYDRATE 50 MG/ML
100 INJECTION, SOLUTION INTRAVENOUS PRN
Status: DISCONTINUED | OUTPATIENT
Start: 2020-01-01 | End: 2020-01-01 | Stop reason: HOSPADM

## 2020-01-01 RX ORDER — FERROUS SULFATE 325(65) MG
325 TABLET ORAL
Status: DISCONTINUED | OUTPATIENT
Start: 2020-01-01 | End: 2020-01-01 | Stop reason: HOSPADM

## 2020-01-01 RX ORDER — HYDROCODONE BITARTRATE AND ACETAMINOPHEN 5; 325 MG/1; MG/1
1 TABLET ORAL EVERY 8 HOURS PRN
Status: DISCONTINUED | OUTPATIENT
Start: 2020-01-01 | End: 2020-01-01 | Stop reason: HOSPADM

## 2020-01-01 RX ORDER — SODIUM CHLORIDE 0.9 % (FLUSH) 0.9 %
10 SYRINGE (ML) INJECTION EVERY 12 HOURS SCHEDULED
Status: DISCONTINUED | OUTPATIENT
Start: 2020-01-01 | End: 2020-01-01 | Stop reason: HOSPADM

## 2020-01-01 RX ORDER — TRAMADOL HYDROCHLORIDE 50 MG/1
50 TABLET ORAL ONCE
Status: COMPLETED | OUTPATIENT
Start: 2020-01-01 | End: 2020-01-01

## 2020-01-01 RX ORDER — NICOTINE POLACRILEX 4 MG
15 LOZENGE BUCCAL PRN
Status: DISCONTINUED | OUTPATIENT
Start: 2020-01-01 | End: 2020-01-01 | Stop reason: HOSPADM

## 2020-01-01 RX ORDER — DEXTROSE MONOHYDRATE 25 G/50ML
12.5 INJECTION, SOLUTION INTRAVENOUS PRN
Status: DISCONTINUED | OUTPATIENT
Start: 2020-01-01 | End: 2020-01-01 | Stop reason: HOSPADM

## 2020-01-01 RX ORDER — LOSARTAN POTASSIUM 50 MG/1
50 TABLET ORAL DAILY
Status: ON HOLD | COMMUNITY
End: 2020-01-01 | Stop reason: HOSPADM

## 2020-01-01 RX ADMIN — ATORVASTATIN CALCIUM 10 MG: 10 TABLET, FILM COATED ORAL at 08:50

## 2020-01-01 RX ADMIN — APIXABAN 2.5 MG: 2.5 TABLET, FILM COATED ORAL at 21:48

## 2020-01-01 RX ADMIN — COLLAGENASE SANTYL: 250 OINTMENT TOPICAL at 09:03

## 2020-01-01 RX ADMIN — Medication 2 PUFF: at 22:09

## 2020-01-01 RX ADMIN — CEFEPIME HYDROCHLORIDE 2 G: 2 INJECTION, POWDER, FOR SOLUTION INTRAVENOUS at 03:26

## 2020-01-01 RX ADMIN — SENNOSIDES 8.6 MG: 8.6 TABLET, FILM COATED ORAL at 08:09

## 2020-01-01 RX ADMIN — SENNOSIDES 8.6 MG: 8.6 TABLET, FILM COATED ORAL at 08:51

## 2020-01-01 RX ADMIN — MICONAZOLE NITRATE: 20 POWDER TOPICAL at 08:52

## 2020-01-01 RX ADMIN — VANCOMYCIN HYDROCHLORIDE 1000 MG: 1 INJECTION, SOLUTION INTRAVENOUS at 11:56

## 2020-01-01 RX ADMIN — APIXABAN 2.5 MG: 2.5 TABLET, FILM COATED ORAL at 22:03

## 2020-01-01 RX ADMIN — MICONAZOLE NITRATE: 20 POWDER TOPICAL at 15:09

## 2020-01-01 RX ADMIN — PAROXETINE HYDROCHLORIDE 20 MG: 20 TABLET, FILM COATED ORAL at 10:05

## 2020-01-01 RX ADMIN — BUSPIRONE HYDROCHLORIDE 5 MG: 5 TABLET ORAL at 08:26

## 2020-01-01 RX ADMIN — BUSPIRONE HYDROCHLORIDE 5 MG: 5 TABLET ORAL at 08:55

## 2020-01-01 RX ADMIN — ONDANSETRON 4 MG: 2 INJECTION INTRAMUSCULAR; INTRAVENOUS at 20:41

## 2020-01-01 RX ADMIN — APIXABAN 2.5 MG: 2.5 TABLET, FILM COATED ORAL at 09:39

## 2020-01-01 RX ADMIN — CEFEPIME HYDROCHLORIDE 2 G: 2 INJECTION, POWDER, FOR SOLUTION INTRAVENOUS at 03:38

## 2020-01-01 RX ADMIN — ATORVASTATIN CALCIUM 10 MG: 10 TABLET, FILM COATED ORAL at 08:55

## 2020-01-01 RX ADMIN — CEFEPIME HYDROCHLORIDE 2 G: 2 INJECTION, POWDER, FOR SOLUTION INTRAVENOUS at 14:29

## 2020-01-01 RX ADMIN — VANCOMYCIN HYDROCHLORIDE 1000 MG: 1 INJECTION, SOLUTION INTRAVENOUS at 23:08

## 2020-01-01 RX ADMIN — COLLAGENASE SANTYL: 250 OINTMENT TOPICAL at 08:52

## 2020-01-01 RX ADMIN — MICONAZOLE NITRATE: 20 POWDER TOPICAL at 22:03

## 2020-01-01 RX ADMIN — COLLAGENASE SANTYL: 250 OINTMENT TOPICAL at 10:39

## 2020-01-01 RX ADMIN — DONEPEZIL HYDROCHLORIDE 10 MG: 10 TABLET, FILM COATED ORAL at 21:48

## 2020-01-01 RX ADMIN — TROSPIUM CHLORIDE 20 MG: 20 TABLET ORAL at 06:41

## 2020-01-01 RX ADMIN — TROSPIUM CHLORIDE 20 MG: 20 TABLET ORAL at 16:24

## 2020-01-01 RX ADMIN — FLUTICASONE PROPIONATE 1 SPRAY: 50 SPRAY, METERED NASAL at 10:24

## 2020-01-01 RX ADMIN — DILTIAZEM HYDROCHLORIDE 30 MG: 30 TABLET, FILM COATED ORAL at 20:40

## 2020-01-01 RX ADMIN — ATORVASTATIN CALCIUM 10 MG: 10 TABLET, FILM COATED ORAL at 09:39

## 2020-01-01 RX ADMIN — DILTIAZEM HYDROCHLORIDE 30 MG: 30 TABLET, FILM COATED ORAL at 15:04

## 2020-01-01 RX ADMIN — MICONAZOLE NITRATE: 20 POWDER TOPICAL at 08:12

## 2020-01-01 RX ADMIN — SODIUM CHLORIDE: 9 INJECTION, SOLUTION INTRAVENOUS at 14:22

## 2020-01-01 RX ADMIN — SODIUM CHLORIDE: 9 INJECTION, SOLUTION INTRAVENOUS at 06:53

## 2020-01-01 RX ADMIN — CEFEPIME HYDROCHLORIDE 2 G: 2 INJECTION, POWDER, FOR SOLUTION INTRAVENOUS at 13:45

## 2020-01-01 RX ADMIN — Medication 2 PUFF: at 20:35

## 2020-01-01 RX ADMIN — TROSPIUM CHLORIDE 20 MG: 20 TABLET ORAL at 05:47

## 2020-01-01 RX ADMIN — DILTIAZEM HYDROCHLORIDE 30 MG: 30 TABLET, FILM COATED ORAL at 06:37

## 2020-01-01 RX ADMIN — FERROUS SULFATE TAB 325 MG (65 MG ELEMENTAL FE) 325 MG: 325 (65 FE) TAB at 08:54

## 2020-01-01 RX ADMIN — PAROXETINE HYDROCHLORIDE 20 MG: 20 TABLET, FILM COATED ORAL at 08:09

## 2020-01-01 RX ADMIN — SENNOSIDES 8.6 MG: 8.6 TABLET, FILM COATED ORAL at 21:00

## 2020-01-01 RX ADMIN — DOCUSATE SODIUM 100 MG: 100 CAPSULE, LIQUID FILLED ORAL at 08:26

## 2020-01-01 RX ADMIN — SENNOSIDES 8.6 MG: 8.6 TABLET, FILM COATED ORAL at 22:03

## 2020-01-01 RX ADMIN — BUSPIRONE HYDROCHLORIDE 5 MG: 5 TABLET ORAL at 20:06

## 2020-01-01 RX ADMIN — FLUTICASONE PROPIONATE 1 SPRAY: 50 SPRAY, METERED NASAL at 10:39

## 2020-01-01 RX ADMIN — TROSPIUM CHLORIDE 20 MG: 20 TABLET ORAL at 10:02

## 2020-01-01 RX ADMIN — APIXABAN 2.5 MG: 2.5 TABLET, FILM COATED ORAL at 08:54

## 2020-01-01 RX ADMIN — SODIUM CHLORIDE: 9 INJECTION, SOLUTION INTRAVENOUS at 10:31

## 2020-01-01 RX ADMIN — APIXABAN 2.5 MG: 2.5 TABLET, FILM COATED ORAL at 21:00

## 2020-01-01 RX ADMIN — SODIUM CHLORIDE, PRESERVATIVE FREE 10 ML: 5 INJECTION INTRAVENOUS at 21:05

## 2020-01-01 RX ADMIN — Medication 2 PUFF: at 08:19

## 2020-01-01 RX ADMIN — ATORVASTATIN CALCIUM 10 MG: 10 TABLET, FILM COATED ORAL at 08:26

## 2020-01-01 RX ADMIN — SODIUM CHLORIDE, PRESERVATIVE FREE 10 ML: 5 INJECTION INTRAVENOUS at 10:12

## 2020-01-01 RX ADMIN — DOCUSATE SODIUM 100 MG: 100 CAPSULE, LIQUID FILLED ORAL at 09:40

## 2020-01-01 RX ADMIN — BUSPIRONE HYDROCHLORIDE 5 MG: 5 TABLET ORAL at 20:59

## 2020-01-01 RX ADMIN — CEFEPIME HYDROCHLORIDE 2 G: 2 INJECTION, POWDER, FOR SOLUTION INTRAVENOUS at 15:04

## 2020-01-01 RX ADMIN — SODIUM CHLORIDE, PRESERVATIVE FREE 10 ML: 5 INJECTION INTRAVENOUS at 22:08

## 2020-01-01 RX ADMIN — ATORVASTATIN CALCIUM 10 MG: 10 TABLET, FILM COATED ORAL at 11:51

## 2020-01-01 RX ADMIN — INSULIN DETEMIR 10 UNITS: 100 INJECTION, SOLUTION SUBCUTANEOUS at 22:17

## 2020-01-01 RX ADMIN — Medication 2 PUFF: at 19:50

## 2020-01-01 RX ADMIN — FERROUS SULFATE TAB 325 MG (65 MG ELEMENTAL FE) 325 MG: 325 (65 FE) TAB at 09:31

## 2020-01-01 RX ADMIN — MICONAZOLE NITRATE: 20 POWDER TOPICAL at 17:39

## 2020-01-01 RX ADMIN — MICONAZOLE NITRATE: 20 POWDER TOPICAL at 16:24

## 2020-01-01 RX ADMIN — BUSPIRONE HYDROCHLORIDE 5 MG: 5 TABLET ORAL at 10:04

## 2020-01-01 RX ADMIN — VANCOMYCIN HYDROCHLORIDE 1000 MG: 1 INJECTION, SOLUTION INTRAVENOUS at 12:12

## 2020-01-01 RX ADMIN — MAGNESIUM SULFATE HEPTAHYDRATE 2 G: 40 INJECTION, SOLUTION INTRAVENOUS at 11:51

## 2020-01-01 RX ADMIN — ONDANSETRON 4 MG: 2 INJECTION INTRAMUSCULAR; INTRAVENOUS at 09:40

## 2020-01-01 RX ADMIN — APIXABAN 2.5 MG: 2.5 TABLET, FILM COATED ORAL at 10:03

## 2020-01-01 RX ADMIN — ATORVASTATIN CALCIUM 10 MG: 10 TABLET, FILM COATED ORAL at 09:31

## 2020-01-01 RX ADMIN — CEFEPIME HYDROCHLORIDE 2 G: 2 INJECTION, POWDER, FOR SOLUTION INTRAVENOUS at 15:16

## 2020-01-01 RX ADMIN — DOCUSATE SODIUM 100 MG: 100 CAPSULE, LIQUID FILLED ORAL at 11:51

## 2020-01-01 RX ADMIN — PAROXETINE HYDROCHLORIDE 20 MG: 20 TABLET, FILM COATED ORAL at 09:31

## 2020-01-01 RX ADMIN — CEFEPIME HYDROCHLORIDE 2 G: 2 INJECTION, POWDER, FOR SOLUTION INTRAVENOUS at 14:21

## 2020-01-01 RX ADMIN — DOCUSATE SODIUM 100 MG: 100 CAPSULE, LIQUID FILLED ORAL at 09:31

## 2020-01-01 RX ADMIN — PAROXETINE HYDROCHLORIDE 20 MG: 20 TABLET, FILM COATED ORAL at 11:51

## 2020-01-01 RX ADMIN — APIXABAN 2.5 MG: 2.5 TABLET, FILM COATED ORAL at 20:05

## 2020-01-01 RX ADMIN — CEFEPIME HYDROCHLORIDE 2 G: 2 INJECTION, POWDER, FOR SOLUTION INTRAVENOUS at 14:22

## 2020-01-01 RX ADMIN — PAROXETINE HYDROCHLORIDE 20 MG: 20 TABLET, FILM COATED ORAL at 08:51

## 2020-01-01 RX ADMIN — Medication 2 PUFF: at 07:59

## 2020-01-01 RX ADMIN — DILTIAZEM HYDROCHLORIDE 30 MG: 30 TABLET, FILM COATED ORAL at 05:01

## 2020-01-01 RX ADMIN — MICONAZOLE NITRATE: 20 POWDER TOPICAL at 10:39

## 2020-01-01 RX ADMIN — Medication 2 PUFF: at 20:54

## 2020-01-01 RX ADMIN — PAROXETINE HYDROCHLORIDE 20 MG: 20 TABLET, FILM COATED ORAL at 08:25

## 2020-01-01 RX ADMIN — SENNOSIDES 8.6 MG: 8.6 TABLET, FILM COATED ORAL at 08:55

## 2020-01-01 RX ADMIN — APIXABAN 2.5 MG: 2.5 TABLET, FILM COATED ORAL at 08:09

## 2020-01-01 RX ADMIN — TROSPIUM CHLORIDE 20 MG: 20 TABLET ORAL at 06:30

## 2020-01-01 RX ADMIN — MAGNESIUM HYDROXIDE 30 ML: 400 SUSPENSION ORAL at 20:40

## 2020-01-01 RX ADMIN — DILTIAZEM HYDROCHLORIDE 120 MG: 120 CAPSULE, COATED, EXTENDED RELEASE ORAL at 10:03

## 2020-01-01 RX ADMIN — DOCUSATE SODIUM 100 MG: 100 CAPSULE, LIQUID FILLED ORAL at 08:54

## 2020-01-01 RX ADMIN — TROSPIUM CHLORIDE 20 MG: 20 TABLET ORAL at 16:55

## 2020-01-01 RX ADMIN — VANCOMYCIN HYDROCHLORIDE 1000 MG: 1 INJECTION, SOLUTION INTRAVENOUS at 13:02

## 2020-01-01 RX ADMIN — PAROXETINE HYDROCHLORIDE 20 MG: 20 TABLET, FILM COATED ORAL at 08:55

## 2020-01-01 RX ADMIN — DONEPEZIL HYDROCHLORIDE 10 MG: 10 TABLET, FILM COATED ORAL at 21:00

## 2020-01-01 RX ADMIN — SODIUM CHLORIDE, PRESERVATIVE FREE 10 ML: 5 INJECTION INTRAVENOUS at 20:06

## 2020-01-01 RX ADMIN — CEFTRIAXONE 1 G: 1 INJECTION, POWDER, FOR SOLUTION INTRAMUSCULAR; INTRAVENOUS at 06:30

## 2020-01-01 RX ADMIN — BUSPIRONE HYDROCHLORIDE 5 MG: 5 TABLET ORAL at 22:03

## 2020-01-01 RX ADMIN — HYDROCODONE BITARTRATE AND ACETAMINOPHEN 1 TABLET: 5; 325 TABLET ORAL at 12:45

## 2020-01-01 RX ADMIN — MAGNESIUM HYDROXIDE 30 ML: 400 SUSPENSION ORAL at 15:10

## 2020-01-01 RX ADMIN — MICONAZOLE NITRATE: 20 POWDER TOPICAL at 21:06

## 2020-01-01 RX ADMIN — SODIUM CHLORIDE: 9 INJECTION, SOLUTION INTRAVENOUS at 22:20

## 2020-01-01 RX ADMIN — SENNOSIDES 8.6 MG: 8.6 TABLET, FILM COATED ORAL at 22:02

## 2020-01-01 RX ADMIN — SODIUM CHLORIDE: 9 INJECTION, SOLUTION INTRAVENOUS at 22:46

## 2020-01-01 RX ADMIN — ALBUTEROL SULFATE 2 PUFF: 90 AEROSOL, METERED RESPIRATORY (INHALATION) at 20:54

## 2020-01-01 RX ADMIN — DILTIAZEM HYDROCHLORIDE 120 MG: 120 CAPSULE, COATED, EXTENDED RELEASE ORAL at 08:28

## 2020-01-01 RX ADMIN — MICONAZOLE NITRATE: 20 POWDER TOPICAL at 22:09

## 2020-01-01 RX ADMIN — HYDROCODONE BITARTRATE AND ACETAMINOPHEN 1 TABLET: 5; 325 TABLET ORAL at 11:51

## 2020-01-01 RX ADMIN — TROSPIUM CHLORIDE 20 MG: 20 TABLET ORAL at 18:20

## 2020-01-01 RX ADMIN — SODIUM CHLORIDE 1000 ML: 9 INJECTION, SOLUTION INTRAVENOUS at 21:43

## 2020-01-01 RX ADMIN — DONEPEZIL HYDROCHLORIDE 10 MG: 10 TABLET, FILM COATED ORAL at 22:02

## 2020-01-01 RX ADMIN — BUSPIRONE HYDROCHLORIDE 5 MG: 5 TABLET ORAL at 09:31

## 2020-01-01 RX ADMIN — SODIUM CHLORIDE: 9 INJECTION, SOLUTION INTRAVENOUS at 09:40

## 2020-01-01 RX ADMIN — CEFEPIME HYDROCHLORIDE 2 G: 2 INJECTION, POWDER, FOR SOLUTION INTRAVENOUS at 01:32

## 2020-01-01 RX ADMIN — FERROUS SULFATE TAB 325 MG (65 MG ELEMENTAL FE) 325 MG: 325 (65 FE) TAB at 08:28

## 2020-01-01 RX ADMIN — TROSPIUM CHLORIDE 20 MG: 20 TABLET ORAL at 05:00

## 2020-01-01 RX ADMIN — APIXABAN 2.5 MG: 2.5 TABLET, FILM COATED ORAL at 22:02

## 2020-01-01 RX ADMIN — SODIUM CHLORIDE: 9 INJECTION, SOLUTION INTRAVENOUS at 01:39

## 2020-01-01 RX ADMIN — SODIUM CHLORIDE: 9 INJECTION, SOLUTION INTRAVENOUS at 09:31

## 2020-01-01 RX ADMIN — SODIUM CHLORIDE: 9 INJECTION, SOLUTION INTRAVENOUS at 05:35

## 2020-01-01 RX ADMIN — HYDROCODONE BITARTRATE AND ACETAMINOPHEN 1 TABLET: 5; 325 TABLET ORAL at 06:41

## 2020-01-01 RX ADMIN — APIXABAN 2.5 MG: 2.5 TABLET, FILM COATED ORAL at 08:26

## 2020-01-01 RX ADMIN — DILTIAZEM HYDROCHLORIDE 30 MG: 30 TABLET, FILM COATED ORAL at 21:51

## 2020-01-01 RX ADMIN — APIXABAN 2.5 MG: 2.5 TABLET, FILM COATED ORAL at 20:40

## 2020-01-01 RX ADMIN — APIXABAN 2.5 MG: 2.5 TABLET, FILM COATED ORAL at 08:51

## 2020-01-01 RX ADMIN — FLUTICASONE PROPIONATE 1 SPRAY: 50 SPRAY, METERED NASAL at 09:03

## 2020-01-01 RX ADMIN — TROSPIUM CHLORIDE 20 MG: 20 TABLET ORAL at 16:02

## 2020-01-01 RX ADMIN — INSULIN DETEMIR 10 UNITS: 100 INJECTION, SOLUTION SUBCUTANEOUS at 22:02

## 2020-01-01 RX ADMIN — BUSPIRONE HYDROCHLORIDE 5 MG: 5 TABLET ORAL at 20:41

## 2020-01-01 RX ADMIN — SODIUM CHLORIDE: 9 INJECTION, SOLUTION INTRAVENOUS at 15:48

## 2020-01-01 RX ADMIN — ATORVASTATIN CALCIUM 10 MG: 10 TABLET, FILM COATED ORAL at 08:09

## 2020-01-01 RX ADMIN — FLUTICASONE PROPIONATE 1 SPRAY: 50 SPRAY, METERED NASAL at 14:21

## 2020-01-01 RX ADMIN — TROSPIUM CHLORIDE 20 MG: 20 TABLET ORAL at 16:54

## 2020-01-01 RX ADMIN — MICONAZOLE NITRATE: 20 POWDER TOPICAL at 10:37

## 2020-01-01 RX ADMIN — SODIUM CHLORIDE: 9 INJECTION, SOLUTION INTRAVENOUS at 23:25

## 2020-01-01 RX ADMIN — MICONAZOLE NITRATE: 20 POWDER TOPICAL at 10:23

## 2020-01-01 RX ADMIN — ONDANSETRON 4 MG: 2 INJECTION INTRAMUSCULAR; INTRAVENOUS at 02:26

## 2020-01-01 RX ADMIN — DILTIAZEM HYDROCHLORIDE 120 MG: 120 CAPSULE, COATED, EXTENDED RELEASE ORAL at 08:55

## 2020-01-01 RX ADMIN — DONEPEZIL HYDROCHLORIDE 10 MG: 10 TABLET, FILM COATED ORAL at 22:03

## 2020-01-01 RX ADMIN — MICONAZOLE NITRATE: 20 POWDER TOPICAL at 20:27

## 2020-01-01 RX ADMIN — BUSPIRONE HYDROCHLORIDE 5 MG: 5 TABLET ORAL at 08:09

## 2020-01-01 RX ADMIN — VANCOMYCIN HYDROCHLORIDE 1000 MG: 1 INJECTION, SOLUTION INTRAVENOUS at 11:26

## 2020-01-01 RX ADMIN — SODIUM CHLORIDE, PRESERVATIVE FREE 10 ML: 5 INJECTION INTRAVENOUS at 20:41

## 2020-01-01 RX ADMIN — BUSPIRONE HYDROCHLORIDE 5 MG: 5 TABLET ORAL at 08:51

## 2020-01-01 RX ADMIN — Medication 2 PUFF: at 06:57

## 2020-01-01 RX ADMIN — CEFEPIME HYDROCHLORIDE 2 G: 2 INJECTION, POWDER, FOR SOLUTION INTRAVENOUS at 02:15

## 2020-01-01 RX ADMIN — ACETAMINOPHEN 650 MG: 325 TABLET ORAL at 11:21

## 2020-01-01 RX ADMIN — SODIUM CHLORIDE, PRESERVATIVE FREE 10 ML: 5 INJECTION INTRAVENOUS at 08:12

## 2020-01-01 RX ADMIN — DEXTROSE 50 % IN WATER (D50W) INTRAVENOUS SYRINGE 12.5 G: at 06:53

## 2020-01-01 RX ADMIN — MICONAZOLE NITRATE: 20 POWDER TOPICAL at 20:39

## 2020-01-01 RX ADMIN — TROSPIUM CHLORIDE 20 MG: 20 TABLET ORAL at 17:09

## 2020-01-01 RX ADMIN — BUSPIRONE HYDROCHLORIDE 5 MG: 5 TABLET ORAL at 09:40

## 2020-01-01 RX ADMIN — APIXABAN 2.5 MG: 2.5 TABLET, FILM COATED ORAL at 11:51

## 2020-01-01 RX ADMIN — VANCOMYCIN HYDROCHLORIDE 1000 MG: 1 INJECTION, SOLUTION INTRAVENOUS at 12:28

## 2020-01-01 RX ADMIN — PAROXETINE HYDROCHLORIDE 20 MG: 20 TABLET, FILM COATED ORAL at 09:38

## 2020-01-01 RX ADMIN — TROSPIUM CHLORIDE 20 MG: 20 TABLET ORAL at 15:42

## 2020-01-01 RX ADMIN — DONEPEZIL HYDROCHLORIDE 10 MG: 10 TABLET, FILM COATED ORAL at 20:41

## 2020-01-01 RX ADMIN — TRAMADOL HYDROCHLORIDE 50 MG: 50 TABLET, FILM COATED ORAL at 03:11

## 2020-01-01 RX ADMIN — SENNOSIDES 8.6 MG: 8.6 TABLET, FILM COATED ORAL at 08:26

## 2020-01-01 RX ADMIN — FERROUS SULFATE TAB 325 MG (65 MG ELEMENTAL FE) 325 MG: 325 (65 FE) TAB at 08:09

## 2020-01-01 RX ADMIN — MAGNESIUM CITRATE 296 ML: 1.75 LIQUID ORAL at 20:40

## 2020-01-01 RX ADMIN — SODIUM CHLORIDE: 9 INJECTION, SOLUTION INTRAVENOUS at 14:29

## 2020-01-01 RX ADMIN — FERROUS SULFATE TAB 325 MG (65 MG ELEMENTAL FE) 325 MG: 325 (65 FE) TAB at 10:04

## 2020-01-01 RX ADMIN — MICONAZOLE NITRATE: 20 POWDER TOPICAL at 09:04

## 2020-01-01 RX ADMIN — FLUTICASONE PROPIONATE 1 SPRAY: 50 SPRAY, METERED NASAL at 08:52

## 2020-01-01 RX ADMIN — DILTIAZEM HYDROCHLORIDE 120 MG: 120 CAPSULE, COATED, EXTENDED RELEASE ORAL at 08:51

## 2020-01-01 RX ADMIN — VANCOMYCIN HYDROCHLORIDE 1000 MG: 1 INJECTION, SOLUTION INTRAVENOUS at 12:02

## 2020-01-01 RX ADMIN — ONDANSETRON 4 MG: 2 INJECTION INTRAMUSCULAR; INTRAVENOUS at 16:55

## 2020-01-01 RX ADMIN — APIXABAN 2.5 MG: 2.5 TABLET, FILM COATED ORAL at 09:31

## 2020-01-01 RX ADMIN — SODIUM CHLORIDE: 9 INJECTION, SOLUTION INTRAVENOUS at 08:55

## 2020-01-01 RX ADMIN — BUSPIRONE HYDROCHLORIDE 5 MG: 5 TABLET ORAL at 21:48

## 2020-01-01 RX ADMIN — BUSPIRONE HYDROCHLORIDE 5 MG: 5 TABLET ORAL at 11:51

## 2020-01-01 RX ADMIN — SODIUM CHLORIDE, PRESERVATIVE FREE 10 ML: 5 INJECTION INTRAVENOUS at 08:51

## 2020-01-01 RX ADMIN — DOCUSATE SODIUM 100 MG: 100 CAPSULE, LIQUID FILLED ORAL at 10:04

## 2020-01-01 RX ADMIN — FLUTICASONE PROPIONATE 1 SPRAY: 50 SPRAY, METERED NASAL at 08:12

## 2020-01-01 RX ADMIN — TROSPIUM CHLORIDE 20 MG: 20 TABLET ORAL at 06:10

## 2020-01-01 RX ADMIN — SODIUM CHLORIDE, PRESERVATIVE FREE 10 ML: 5 INJECTION INTRAVENOUS at 09:35

## 2020-01-01 RX ADMIN — DILTIAZEM HYDROCHLORIDE 30 MG: 30 TABLET, FILM COATED ORAL at 15:15

## 2020-01-01 RX ADMIN — SENNOSIDES 8.6 MG: 8.6 TABLET, FILM COATED ORAL at 10:04

## 2020-01-01 RX ADMIN — CEFEPIME HYDROCHLORIDE 2 G: 2 INJECTION, POWDER, FOR SOLUTION INTRAVENOUS at 03:00

## 2020-01-01 RX ADMIN — TROSPIUM CHLORIDE 20 MG: 20 TABLET ORAL at 06:37

## 2020-01-01 RX ADMIN — CEFEPIME HYDROCHLORIDE 2 G: 2 INJECTION, POWDER, FOR SOLUTION INTRAVENOUS at 14:04

## 2020-01-01 RX ADMIN — DONEPEZIL HYDROCHLORIDE 10 MG: 10 TABLET, FILM COATED ORAL at 20:05

## 2020-01-01 RX ADMIN — MICONAZOLE NITRATE: 20 POWDER TOPICAL at 22:17

## 2020-01-01 RX ADMIN — INSULIN DETEMIR 10 UNITS: 100 INJECTION, SOLUTION SUBCUTANEOUS at 21:42

## 2020-01-01 RX ADMIN — SODIUM CHLORIDE, PRESERVATIVE FREE 10 ML: 5 INJECTION INTRAVENOUS at 08:55

## 2020-01-01 RX ADMIN — ATORVASTATIN CALCIUM 10 MG: 10 TABLET, FILM COATED ORAL at 10:04

## 2020-01-01 RX ADMIN — DOCUSATE SODIUM 100 MG: 100 CAPSULE, LIQUID FILLED ORAL at 08:51

## 2020-01-01 RX ADMIN — MICONAZOLE NITRATE: 20 POWDER TOPICAL at 22:04

## 2020-01-01 RX ADMIN — TROSPIUM CHLORIDE 20 MG: 20 TABLET ORAL at 06:14

## 2020-01-01 RX ADMIN — COLLAGENASE SANTYL: 250 OINTMENT TOPICAL at 10:24

## 2020-01-01 RX ADMIN — BUSPIRONE HYDROCHLORIDE 5 MG: 5 TABLET ORAL at 22:02

## 2020-01-01 RX ADMIN — SODIUM CHLORIDE, PRESERVATIVE FREE 10 ML: 5 INJECTION INTRAVENOUS at 21:48

## 2020-01-01 RX ADMIN — DOCUSATE SODIUM 100 MG: 100 CAPSULE, LIQUID FILLED ORAL at 08:09

## 2020-01-01 RX ADMIN — CEFEPIME HYDROCHLORIDE 2 G: 2 INJECTION, POWDER, FOR SOLUTION INTRAVENOUS at 01:47

## 2020-01-01 ASSESSMENT — PAIN SCALES - WONG BAKER
WONGBAKER_NUMERICALRESPONSE: 0
WONGBAKER_NUMERICALRESPONSE: 2
WONGBAKER_NUMERICALRESPONSE: 0

## 2020-01-01 ASSESSMENT — PAIN SCALES - PAIN ASSESSMENT IN ADVANCED DEMENTIA (PAINAD)
NEGVOCALIZATION: 0
TOTALSCORE: 0
BREATHING: 0
FACIALEXPRESSION: 0
NEGVOCALIZATION: 0
BODYLANGUAGE: 0
CONSOLABILITY: 0
NEGVOCALIZATION: 0
BODYLANGUAGE: 0
TOTALSCORE: 0
CONSOLABILITY: 0
NEGVOCALIZATION: 0
FACIALEXPRESSION: 0
TOTALSCORE: 0
BODYLANGUAGE: 0
FACIALEXPRESSION: 0
BODYLANGUAGE: 0
FACIALEXPRESSION: 0
TOTALSCORE: 0
BREATHING: 0
CONSOLABILITY: 0
BREATHING: 0
BREATHING: 0
CONSOLABILITY: 0

## 2020-01-01 ASSESSMENT — PAIN SCALES - GENERAL
PAINLEVEL_OUTOF10: 0
PAINLEVEL_OUTOF10: 5
PAINLEVEL_OUTOF10: 0
PAINLEVEL_OUTOF10: 0
PAINLEVEL_OUTOF10: 4
PAINLEVEL_OUTOF10: 0
PAINLEVEL_OUTOF10: 8
PAINLEVEL_OUTOF10: 0
PAINLEVEL_OUTOF10: 8
PAINLEVEL_OUTOF10: 5
PAINLEVEL_OUTOF10: 0
PAINLEVEL_OUTOF10: 2
PAINLEVEL_OUTOF10: 0
PAINLEVEL_OUTOF10: 2
PAINLEVEL_OUTOF10: 0
PAINLEVEL_OUTOF10: 8
PAINLEVEL_OUTOF10: 0
PAINLEVEL_OUTOF10: 8
PAINLEVEL_OUTOF10: 0

## 2020-01-01 ASSESSMENT — ENCOUNTER SYMPTOMS
BACK PAIN: 0
CHEST TIGHTNESS: 0
CHEST TIGHTNESS: 0
COLOR CHANGE: 0
BACK PAIN: 0
COLOR CHANGE: 0

## 2020-01-01 ASSESSMENT — PAIN DESCRIPTION - LOCATION
LOCATION: GENERALIZED
LOCATION: ABDOMEN

## 2020-01-01 ASSESSMENT — PAIN DESCRIPTION - PAIN TYPE
TYPE: ACUTE PAIN

## 2020-01-26 NOTE — ED PROVIDER NOTES
Triage Chief Complaint:   No chief complaint on file. [place]:  Nina Modi is a [de-identified] y.o. female that presents today to the emergency department for head trauma fall. Context this patient believes that \"they made the bed wrong\" patient states that she slipped on the bed sheet falling and hitting the back of her head on a hard object unsure whether she hit it on. She is on blood thinners per patient. She denies syncope changes in vision dizziness or headache but does endorse scalp/head pain. No chest pain back pain abdominal pain flank pain or shortness of breath. No loss of consciousness, no change in vision, no syncope, no vomiting, no confusion, no dizziness, no musculoskeletal weakness. ROS:  At least 06 systems reviewed and otherwise negative except as in the [address] Past Medical History:   Diagnosis Date    Allergic rhinitis     Asthma     Atrial fibrillation (HCC)     **PCP follows PT/INRs, along w/prescribing Coumadin. **    CAD (coronary artery disease)     Carotid artery stenosis 11/15/2004    Mild disease noted bilaterally- per Carotid doppler    Carpal tunnel syndrome, bilateral     S/P bilateral repair 1994    COPD (chronic obstructive pulmonary disease) ([address]     Depression     Diabetes type 2, controlled ([address]     Diabetes type 2, controlled ([address]     Emphysema     Family history of diabetes mellitus (DM)     Mother and Father    Gastro-esophageal reflux disease with esophagitis     H/O 24 hour EKG monitoring 10/10/2001    10/10/2001-Predominant rhythm is sinus rhythm. Infrequent ventricular ectopic beats noted. Freq episodes of suprventricular ectopy noted with episodes of runs of supraventricular tachycardia and also 1 short run of atrial fibrillation.  H/O cardiac catheterization 7/21/2003, 9/26/2001 7/21/2003-No significant CAD. Cardiolite was probably false positive.  H/O cardiovascular stress test 8/3/2005, 11/11/2004, 7/10/2003,9/5/2001    8/3/2005-EF 66%. Normal exercise performance without angina or ischemic EKG changes. Cardiolite study demonstrates normal perfusion in all segments of the myocardium with an intact LV systolic function    H/O Doppler ultrasound 11/15/2004    CAROTID DOPPLER-11/15/2004-Mild degree of disease noted bilaterally. Vertebral artery has normal antegrade flow.  H/O echocardiogram 7/19/2005, 11/15/2004, 7/10/2003, 7/2001 8/77/6176- LV systolic function normal. EF =>55%. Left atrium is mildly dilated. Right ventricular systolic pressure is elevated at 40-50mmHg.  Herniation of lumbar intervertebral disc     Hyperlipidemia     Hypertension     Incontinence of urine     self cath- followed by Dr Mir Bangura Irritable bowel syndrome     Memory loss     starting trial aricept 5mg daily 11/3/16    Menopause     Obesity     Obstructive sleep apnea on CPAP     since 1991    Osteoarthritis     bilateral knees    S/P colonoscopy 8/2010    Dr Brenda Al, recheck 2015    Venous insufficiency (chronic) (peripheral)      Past Surgical History:   Procedure Laterality Date    APPENDECTOMY  1951    CARPAL TUNNEL RELEASE  1994    BIlateral    CHOLECYSTECTOMY  1952    DIAGNOSTIC CARDIAC CATH LAB PROCEDURE  7/21/2003, 9/26/2001 7/21/2003-No significant CAD. Cardiolite was probably false positive.     INCISION AND DRAINAGE Right 12/6/2019    LEG INCISION AND DRAINAGE performed by Gregory Pope DO at [address]     Family History   Problem Relation Age of Onset    Diabetes Mother     High Blood Pressure Mother     Cirrhosis Mother     Heart Disease Mother         CHF, pacemaker    Breast Cancer Mother     Alcohol Abuse Mother     Heart Disease Father         CHF    Coronary Art Dis Father     Arrhythmia Father         pacemaker    Diabetes Father      Social History     Socioeconomic History    Marital status:      Spouse name: Not on file    Number of children: 2    Years of education: Not on file    Highest education level: Not on file   Occupational History    Not on file   Social Needs    Financial resource strain: Not on file    Food insecurity:     Worry: Not on file     Inability: Not on file    Transportation needs:     Medical: Not on file     Non-medical: Not on file   Tobacco Use    Smoking status: Former Smoker     Last attempt to quit: 1986     Years since quittin.0    Smokeless tobacco: Never Used   Substance and Sexual Activity    Alcohol use: No     Alcohol/week: 0.0 standard drinks    Drug use: No    Sexual activity: Not on file   Lifestyle    Physical activity:     Days per week: Not on file     Minutes per session: Not on file    Stress: Not on file   Relationships    Social connections:     Talks on phone: Not on file     Gets together: Not on file     Attends Orthodox service: Not on file     Active member of club or organization: Not on file     Attends meetings of clubs or organizations: Not on file     Relationship status: Not on file    Intimate partner violence:     Fear of current or ex partner: Not on file     Emotionally abused: Not on file     Physically abused: Not on file     Forced sexual activity: Not on file   Other Topics Concern    Not on file   Social History Narrative    Not on file     Current Facility-Administered Medications   Medication Dose Route Frequency Provider Last Rate Last Dose    traMADol (ULTRAM) tablet 50 mg  50 mg Oral Once Genice ANSLEY Foster         Current Outpatient Medications   Medication Sig Dispense Refill    acetaminophen (TYLENOL) 500 MG tablet Take 1,000 mg by mouth every 8 hours as needed for Pain      magnesium hydroxide (MILK OF MAGNESIA) 400 MG/5ML suspension Take 10 mLs by mouth daily as needed for Constipation      loperamide (IMODIUM) 2 MG capsule Take 2 mg by mouth 4 times daily as needed for Diarrhea Indications: Loose Stools      Chocolate    Penicillins Rash       Nursing Notes Reviewed    Physical Exam:  ED Triage Vitals   Enc Vitals Group      BP 01/26/20 0153 116/77      Pulse 01/26/20 0153 75      Resp 01/26/20 0153 19      Temp 01/26/20 0155 98.3 °F (36.8 °C)      Temp Source 01/26/20 0153 Oral      SpO2 01/26/20 0153 99 %      Weight 01/26/20 0143 184 lb (83.5 kg)      Height 01/26/20 0143 5' 8\" (1.727 m)      Head Circumference --       Peak Flow --       Pain Score --       Pain Loc --       Pain Edu? --       Excl. in [address]? --      GENERAL APPEARANCE: Awake and alert. Cooperative. No acute distress. HEAD: Normocephalic. Large posterior scalp hematoma noted no laceration. No hemotympanum, Cma[place] sign, raccoon eyes, periorbital tenderness, nasal bone tenderness, mandibular tenderness, skull tenderness  CERVICAL SPINE: There is no cervical midline tenderness to palpation, step-offs, or acute deformities. No posterior midline pain on ROM. The cervical spine has been cleared clinically. EYES: EOM's grossly intact. Sclera anicteric. PERRLA. Conjunctiva non injected. No discharge  ENT: Mucous membranes are moist. No trismus. Posterior Pharynx non injected, no tongue swelling, airway patent, no tonsillar edema. No exudates noted. No abscess. No discharge. Middle ear spaces clear. Tympanic membrane non injected. Auditory canal clear. NECK: Supple. No meningismus. No palpable masses. No lymphadenopathy. CARDIOVASCULAR: RRR. No rubs, murmurs, gallops, clicks. Radial pulses 2+. Pedal Pulses 2+. Capillary refill <2 seconds. LUNGS: Respirations unlabored. CTAB. ABDOMEN: Soft. Non-tender. No guarding or rebound. No organomegaly. No palpable masses  MUSCULOSKELETAL: No acute deformities. SKIN: Warm and dry. No rash, No erythema, No edema. No ecchymoses. Neurological:   A&Ox4. GCS 15.     Cranial nerves 2-12 grossly intact, PEERLA, EOMs intact, Short and long term memory intact, Pt shows good judgement, follows command well, carries on logical conversation. No ataxia with finger to nose.  strength full bilateral.  UE, LE, Facial sensation full and equal bilateral, no cerebellar dysfunction, negative motor drift. Bicep, Triceps,  strength full and symmetrical. LE DTRs full and symmetrical. Sharp and dull sensation in distal extremities present. PSYCHIATRIC: Normal mood. I have reviewed and interpreted all of the currently available lab results from this visit (if applicable):  No results found for this visit on 01/26/20. Radiographs (if obtained):  [] The following radiograph was interpreted by myself in the absence of a radiologist:   [] Radiologist's Report Reviewed:  CT Cervical Spine WO Contrast   Final Result   1. No acute traumatic abnormality involving the cervical spine. 2. Extensive multilevel degenerative disease throughout the cervical spine. CT Head WO Contrast   Final Result   1. Motion compromised study but there is a right cephalohematoma but no acute   intracranial abnormality. 2. Diffuse cerebral atrophy with chronic small vessel ischemic disease. EKG (if obtained):   Please See Note of attending physician for EKG interpretation. Chart review shows recent radiograph(s):  No results found. MDM:   Patient presents today with head trauma. No sign of basilar skull fracture, neurological deficit as neuro exam is negative. No sign of intracranial hemorrhage. CT scan of head showed no acute intracranial abnormalities per radiologist read. I believe there is a very LOW risk of intracranial hemorrhage, neurological deficit, acute intracranial process, skull fracture, other. Patient will be discharged home with medications for pain. Pt is to be discharged home. Pt is  to return immediately to the emergency department if he has any new, worrisome or worsening symptoms. Pt is to follow up with PCP within 2 days.   Patient/Surrogate vocalizes agreement and understanding with this plan and he has no questions upon disposition. Pt is comfortable upon disposition home. Patient is stable, Patients vital signs are stable. Vital signs and nursing notes reviewed during ED course. I independently managed patient today in the ED. All pertinent Lab data and radiographic results reviewed with patient at bedside. The patient and/or the family were informed of the results of any tests/labs/imaging, the treatment plan, and time was allotted to answer questions. See chart for details of medications given during the ED stay. /64   Pulse 77   Temp 98.3 °F (36.8 °C) (Oral)   Resp 19   Ht 5' 8\" (1.727 m)   Wt 184 lb (83.5 kg)   SpO2 99%   BMI 27.98 kg/m²       Clinical Impression:  1. Fall from bed, initial encounter    2. Hematoma of frontal scalp, initial encounter        Disposition referral (if applicable):  Mele Singh MD  [address]  [phone]    In 2 days      Disposition medications (if applicable):  New Prescriptions    No medications on file       (Please note that portions of this note may have been completed with a voice recognition program. Efforts were made to edit the dictations but occasionally words are mis-transcribed.)    ANSLEY Zavala, [place]  01/26/20 9825

## 2020-01-27 NOTE — PATIENT INSTRUCTIONS
Wound redressed with clean Non-stick dressings   Keep wound clean and dry   Allow wound to air-dry as much as possible and change the dressings at least 2 times a day until wound care takes over   Referral to [facility] wound care  Ok to shower with water and soap, no tub soaks or submerging the wound  Recommend physician at SNF evaluate head for hematoma s/p fall (per patient and daughter)   Follow up in 6-8 weeks

## 2020-01-28 NOTE — PROGRESS NOTES
disease) ([facility]     Depression     Diabetes type 2, controlled ([address]     Diabetes type 2, controlled ([address]     Emphysema     Family history of diabetes mellitus (DM)     Mother and Father    Gastro-esophageal reflux disease with esophagitis     H/O 24 hour EKG monitoring 10/10/2001    10/10/2001-Predominant rhythm is sinus rhythm. Infrequent ventricular ectopic beats noted. Freq episodes of suprventricular ectopy noted with episodes of runs of supraventricular tachycardia and also 1 short run of atrial fibrillation.  H/O cardiac catheterization 7/21/2003, 9/26/2001 7/21/2003-No significant CAD. Cardiolite was probably false positive.  H/O cardiovascular stress test 8/3/2005, 11/11/2004, 7/10/2003,9/5/2001    8/3/2005-EF 66%. Normal exercise performance without angina or ischemic EKG changes. Cardiolite study demonstrates normal perfusion in all segments of the myocardium with an intact LV systolic function    H/O Doppler ultrasound 11/15/2004    CAROTID DOPPLER-11/15/2004-Mild degree of disease noted bilaterally. Vertebral artery has normal antegrade flow.  H/O echocardiogram 7/19/2005, 11/15/2004, 7/10/2003, 7/2001 6/68/3163- LV systolic function normal. EF =>55%. Left atrium is mildly dilated. Right ventricular systolic pressure is elevated at 40-50mmHg.  Herniation of lumbar intervertebral disc     Hyperlipidemia     Hypertension     Incontinence of urine     self cath- followed by Dr Jeff Aguirre Irritable bowel syndrome     Memory loss     starting trial aricept 5mg daily 11/3/16    Menopause     Obesity     Obstructive sleep apnea on CPAP     since 1991    Osteoarthritis     bilateral knees    S/P colonoscopy 8/2010    Dr Bethany Daniels, recheck 2015    Venous insufficiency (chronic) (peripheral)        Objective:   Physical Exam  Constitutional:       Appearance: She is well-developed. HENT:      Head: Normocephalic.    Eyes:      Pupils: Pupils are equal, round, and reactive to light. Neck:      Musculoskeletal: Normal range of motion. Pulmonary:      Effort: Pulmonary effort is normal.   Musculoskeletal: Normal range of motion. General: Tenderness present. No deformity. Right hip: Normal.      Left hip: Normal.      Right knee: She exhibits normal range of motion, no swelling, no effusion, no ecchymosis, no deformity, no laceration, no erythema, normal alignment, no LCL laxity, normal patellar mobility, no bony tenderness and no MCL laxity. No tenderness found. No medial joint line, no lateral joint line, no MCL, no LCL and no patellar tendon tenderness noted. Left knee: Normal. She exhibits normal range of motion, no swelling, no effusion, no ecchymosis, no deformity, no laceration, no erythema, normal alignment, no LCL laxity, normal patellar mobility, no bony tenderness and no MCL laxity. No tenderness found. No medial joint line, no lateral joint line, no MCL, no LCL and no patellar tendon tenderness noted. Right lower leg: She exhibits tenderness and swelling. She exhibits no bony tenderness. Edema present. Skin:     General: Skin is warm and dry. Capillary Refill: Capillary refill takes less than 2 seconds. Coloration: Skin is not pale. Findings: Lesion present. No erythema or rash. Neurological:      Mental Status: She is alert and oriented to person, place, and time. Right leg-there is a large area of superficial skin dehiscence overlying the medial leg in the location of the prior hematoma, early granulation tissue present, no purulent drainage. She is able to dorsiflex and plantarflex her foot without much pain and can flex and extend her knee without much pain. Assessment:      Right leg hematoma I&D, 6 weeks  Right leg superficial skin necrosis      Plan:      I discussed with her and her daughter today that she is doing very well.   At this point I will get her set up for a referral to the wound clinic for dressing  and continue to monitor as the skin to granulate over. I reassured her that there is no sign of any active or deep infection that requires additional surgical treatment at this point. Continue weight-bearing as tolerated. Continue range of motion exercises as instructed. Ice and elevate as needed. Tylenol or Motrin for pain. Follow up in 6 weeks for recheck.         Gian 97, DO

## 2020-01-30 PROBLEM — I95.9 HYPOTENSION: Status: ACTIVE | Noted: 2020-01-01

## 2020-01-30 NOTE — CONSULTS
Palliative Medicine Consultation    Reason for Consult:      __X___ Advance Care Planning  __X___Transition of Care Planning  __X___ Psychosocial Support  _____ Symptom Management:     Recommendations:    1. Continue with the excellent care of this patient with hypotension, falls and progressive dementia  2. Change code status to [facility] with continued treatment  3. Hospice consult to assess patient's eligibility    Requesting Physician:  Dr. Nandini Cruz:      History Obtained From:  patient, family member - son, electronic medical record    HISTORY OF PRESENT ILLNESS:    79year old female who presented to the ED from an ECF for hypotension. She has had numerous recent falls the last of which was 2 days PTA in which she hit her head. She also fell in December and had a large right lower leg hematoma which needed to be surgically drained. Patient with a history of Afib on AC, hypertension, CAD, DM-2,  and memory loss for which she is taking Aricept. Palliative Care was asked to see the patient for goals of care and medical decision making. Past Medical History:        Diagnosis Date    Allergic rhinitis     Asthma     Atrial fibrillation (HCC)     **PCP follows PT/INRs, along w/prescribing Coumadin. **    CAD (coronary artery disease)     Carotid artery stenosis 11/15/2004    Mild disease noted bilaterally- per Carotid doppler    Carpal tunnel syndrome, bilateral     S/P bilateral repair 1994    COPD (chronic obstructive pulmonary disease) ([address]     Depression     Diabetes type 2, controlled ([address]     Diabetes type 2, controlled ([address]     Emphysema     Family history of diabetes mellitus (DM)     Mother and Father    Gastro-esophageal reflux disease with esophagitis     H/O 24 hour EKG monitoring 10/10/2001    10/10/2001-Predominant rhythm is sinus rhythm. Infrequent ventricular ectopic beats noted.  Freq episodes of suprventricular ectopy noted with episodes of runs Has 2 children and is . Family History:       Problem Relation Age of Onset    Diabetes Mother     High Blood Pressure Mother     Cirrhosis Mother     Heart Disease Mother         CHF, pacemaker    Breast Cancer Mother     Alcohol Abuse Mother     Heart Disease Father         CHF    Coronary Art Dis Father     Arrhythmia Father         pacemaker    Diabetes Father        REVIEW OF SYSTEMS:    Review of systems not obtained due to patient factors - progressive dementia    Vitals:    Vitals:    01/30/20 0759   BP:    Pulse: 63   Resp:    Temp:    SpO2:        PHYSICAL EXAM: No visitors at bedside    GENERAL: sitting up in bed eating breakfast  HEENT: No cervical adenopathy, MM moist, PERRL  COR: Irregular rhythm with regular rate  LUNGS: CTA  ABD: soft, ND/NT +BS  SKIN: no rashes, right lower leg wound healing well  PSYCH: some confusion  NEURO: CN II-XII grossly intact    DATA:    CBC:   Lab Results   Component Value Date    WBC 8.9 01/29/2020    RBC 3.34 01/29/2020    HGB 9.9 01/29/2020    HCT 33.4 01/29/2020    .0 01/29/2020    MCH 29.6 01/29/2020    MCHC 29.6 01/29/2020    RDW 13.3 01/29/2020     01/29/2020    MPV 9.9 01/29/2020     CMP:    Lab Results   Component Value Date     01/29/2020    K 4.0 01/29/2020    CL 95 01/29/2020    CO2 24 01/29/2020    BUN 33 01/29/2020    CREATININE 1.4 01/29/2020    GFRAA 44 01/29/2020    GFRAA >60 01/04/2013    AGRATIO 1.4 10/14/2015    LABGLOM 36 01/29/2020    LABGLOM 56 11/08/2013    GLUCOSE 120 01/29/2020    PROT 6.0 01/29/2020    PROT 6.9 01/04/2013    LABALBU 3.1 01/29/2020    CALCIUM 8.3 01/29/2020    BILITOT 0.5 01/29/2020    ALKPHOS 89 01/29/2020    AST 9 01/29/2020    ALT 7 01/29/2020     Albumin:    Lab Results   Component Value Date    LABALBU 3.1 01/29/2020     IMPRESSION:    79year old female with hypotension and falls for Palliative Care encounter. I spoke with the patient regarding her goals.   She had some confusion in that she did not know where she lived. She is incontinent and essentially non-ambulatory. She has difficulty feeding herself. She states that she does have a POA and that is her son, Koki Jurado. I spoke with Koki Jurado and asked if his mother had ever discussed with him her wishes and he stated that she never wanted to be\"intubated or have live support. \"  He stated that her quality of life is terrible compare to what it used to be. We discussed resuscitation and he would like her to be a [facility]. I offered a hospice consult to see if she was hospice eligible and he was agreeable to this.     Electronically signed by Bronwyn Orozco MD on 1/30/2020 at 10:02 AM

## 2020-01-30 NOTE — CARE COORDINATION
Pt is from [employer]. Pt and son plan for pt to return to [address] [place] [address] at discharge. LSW spoke with Ze Brown and pt can return to the NH once medically stable no precert is needed. LSW received a consul.t for Hospice. LSW spoke with pt leonel and he requested [facility]. LSW made referral and they will call pt son and arrange for a meeting. When pt is ready for discharge CM will need a JULIA from RN and doctor. If pt is discharged after hours please complete the following. ... Call report to 21 [phone]  Fax completed AVS with both JULIA on the AVS and any written Rx [phone]. Set up transportation (pt's choice) Jewel Kimbrough [phone] or [facility] [phone] and call family.

## 2020-01-30 NOTE — DISCHARGE INSTR - COC
Continuity of Care Form    Patient Name: Selam Zee   :  1940  MRN:  [id number]    Admit date:  2020  Discharge date:  2020    Code Status Order: DNR-CC   Advance Directives:     Admitting Physician:  Haris Ordoñez MD  PCP: Karon Delgadillo MD    Discharging Nurse: Terrence Velasquez 23 Unit/Room#: 0639/1452-J  Discharging Unit Phone Number: [phone]    Emergency Contact:   Extended Emergency Contact Information  Primary Emergency Contact: JesusJuanyShannen  Address: [address] [address] [place] [address] [facility]  Home Phone: [phone]  Mobile Phone: [phone]  Relation: Child  Secondary Emergency Contact: [address] Phone: [phone]  Mobile Phone: [phone]  Relation: Child    Past Surgical History:  Past Surgical History:   Procedure Laterality Date    Tianna Paris 11    BIlateral   [facility] CATH LAB PROCEDURE  2003, 2001-No significant CAD. Cardiolite was probably false positive.     INCISION AND DRAINAGE Right 2019    LEG INCISION AND DRAINAGE performed by Elisa Martinez DO at [facility]       Immunization History:   Immunization History   Administered Date(s) Administered    Influenza 10/02/2013    Influenza Virus Vaccine 2011, 2015    Influenza, High Dose (Fluzone 65 yrs and older) 2014, 2016    Pneumococcal Polysaccharide (Aveptbfec45) 2011, 11/10/2015    Td, unspecified formulation 2006       Active Problems:  Patient Active Problem List   Diagnosis Code    Lymphedema of lower extremity I89.0    CAD (coronary artery disease) I25.10    Hypertension I10    COPD (chronic obstructive pulmonary disease) J44.9    Irritable bowel syndrome K58.9    Hyperlipidemia E78.5    Depression F32.9    Obstructive sleep apnea on CPAP G47.33, Z99.89    H/O cardiac catheterization Z98.890    Asthma traumatic wound infection  (Active)   Number of days: 2577       Wound 01/10/13 Leg Right RIGHT LATERAL LEG WOUND ( WOUND # 14 ONSET DATE 1 WEEK) Post traumatic wound infection (Active)   Number of days: 5105       Wound 11/08/15 Abrasion(s) Abdomen  pink open area (Active)   Number of days: 1544       Wound 11/08/15 Sacrum erythema with small open area noted  (Active)   Number of days: 1544       Wound 11/08/15 Buttocks erythema  (Active)   Number of days: 1544       Wound 11/08/15 Abdomen abdominal skin folds, skin moist and pink in color (Active)   Number of days: 1544        Elimination:  Continence:   · Bowel: No  · Bladder: Yes  Urinary Catheter: Indication for Use of Catheter: Acute urinary retention/obstruction   Colostomy/Ileostomy/Ileal Conduit: No       Date of Last BM: 2/9/2020    Intake/Output Summary (Last 24 hours) at 1/31/2020 1455  Last data filed at 1/31/2020 1444  Gross per 24 hour   Intake 610 ml   Output 1050 ml   Net -440 ml     I/O last 3 completed shifts: In: 10 [I.V.:10]  Out: 0 [Urine:1050]    Safety Concerns:     Sundowners [place], History of Falls (last 30 days) and At Risk for Falls    Impairments/Disabilities:      None      Patient's personal belongings (please select all that are sent with patient):  Glasses    RN SIGNATURE:  Electronically signed by Elisa Butler RN on 2/9/20 at 12:37 PM      PHYSICIAN SECTION    Prognosis: Good    Condition at Discharge: Stable    Rehab Potential (if transferring to Rehab): Good    Recommended Labs or Other Treatments After Discharge:     Physician Certification: I certify the above information and transfer of Brandy   is necessary for the continuing treatment of the diagnosis listed and that she requires [facility] for greater 30 days.      Update Admission H&P: No change in H&P     Nutrition Therapy:  Current Nutrition Therapy:   - Oral Diet:  Cardiac    Routes of Feeding: Oral  Liquids: No Restrictions  Daily Fluid Restriction: no  Last Modified Barium Swallow with Video (Video Swallowing Test): not done    Treatments at the Time of Hospital Discharge:   Respiratory Treatments:   Oxygen Therapy:  is not on home oxygen therapy.   Ventilator:    - No ventilator support    Rehab Therapies: Physical Therapy  Weight Bearing Status/Restrictions: No weight bearing restirctions  Other Medical Equipment (for information only, NOT a DME order):  wheelchair  Other Treatments:       PHYSICIAN SIGNATURE:  Electronically signed by Michele Lopes MD on 2/8/20 at 4:27 PM

## 2020-01-30 NOTE — H&P
8/3/2005-EF 66%. Normal exercise performance without angina or ischemic EKG changes. Cardiolite study demonstrates normal perfusion in all segments of the myocardium with an intact LV systolic function    H/O Doppler ultrasound 11/15/2004    CAROTID DOPPLER-11/15/2004-Mild degree of disease noted bilaterally. Vertebral artery has normal antegrade flow.  H/O echocardiogram 7/19/2005, 11/15/2004, 7/10/2003, 7/2001 5/92/6758- LV systolic function normal. EF =>55%. Left atrium is mildly dilated. Right ventricular systolic pressure is elevated at 40-50mmHg.  Herniation of lumbar intervertebral disc     Hyperlipidemia     Hypertension     Incontinence of urine     self cath- followed by Dr Tigist Hoover Irritable bowel syndrome     Memory loss     starting trial aricept 5mg daily 11/3/16    Menopause     Obesity     Obstructive sleep apnea on CPAP     since 1991    Osteoarthritis     bilateral knees    S/P colonoscopy 8/2010    Dr Zander Reeys, recheck 2015    Venous insufficiency (chronic) (peripheral)      Past Surgical History:   Procedure Laterality Date    APPENDECTOMY  1951    CARPAL TUNNEL RELEASE  1994    BIlateral    CHOLECYSTECTOMY  1952    DIAGNOSTIC CARDIAC CATH LAB PROCEDURE  7/21/2003, 9/26/2001 7/21/2003-No significant CAD. Cardiolite was probably false positive.     INCISION AND DRAINAGE Right 12/6/2019    LEG INCISION AND DRAINAGE performed by Wellington Lee DO at [facility]     Family History   Problem Relation Age of Onset    Diabetes Mother     High Blood Pressure Mother     Cirrhosis Mother     Heart Disease Mother         CHF, pacemaker    Breast Cancer Mother     Alcohol Abuse Mother     Heart Disease Father         CHF    Coronary Art Dis Father     Arrhythmia Father         pacemaker    Diabetes Father      Family Hx of HTN  Family Hx as reviewed above, otherwise non-contributory  Social History LOW BACK PAIN) ) 30 tablet 0    PARoxetine (PAXIL) 40 MG tablet TAKE 1 TABLET BY MOUTH EVERY MORNING (Patient taking differently: Take 20 mg by mouth every morning Indications: Depression ) 30 tablet 2    Skin Protectants, Misc. (ALOE VESTA PROTECTIVE) OINT ointment APPLY TO GROIN DAILY 226 g 6    ranitidine (ZANTAC) 150 MG tablet TAKE 1 TABLET BY MOUTH 2 TIMES DAILY. 60 tablet 5    dicyclomine (BENTYL) 10 MG capsule TAKE ONE CAPSULE BY MOUTH 3 TIMES A DAY AS NEEDED 120 capsule 3    PULMICORT FLEXHALER 180 MCG/ACT AEPB inhaler INHALE 2 PUFFS INTO THE LUNGS 2 TIMES DAILY 1 each 5    Blood Glucose Monitoring Suppl (ONE TOUCH ULTRA MINI) W/DEVICE KIT 1 kit by Does not apply route daily 1 kit 0    Incontinence Supplies ([place] CATHETER CARE) KIT Use as directed by doctor. 24 inc with a 30 balloon. 1 kit 3    albuterol sulfate  (90 BASE) MCG/ACT inhaler Inhale 2 puffs into the lungs every 6 hours as needed for Wheezing 3 Inhaler 1    nystatin (MYCOSTATIN) 751723 UNIT/GM powder Apply topically 4 times daily Apply topically 4 times daily.  SKIN PROTECTANTS, MISC.  EX Apply 1 Squirt topically 3 times daily as needed      ferrous sulfate 325 (65 FE) MG tablet Take 325 mg by mouth daily (with breakfast)      fexofenadine (ALLEGRA) 180 MG tablet Take 180 mg by mouth daily      meclizine (ANTIVERT) 12.5 MG tablet Take 12.5 mg by mouth 3 times daily as needed      busPIRone (BUSPAR) 7.5 MG tablet Take 5 mg by mouth 2 times daily       nitroGLYCERIN (NITROSTAT) 0.4 MG SL tablet Place 0.4 mg under the tongue every 5 minutes as needed for Chest pain      Fesoterodine Fumarate ER (TOVIAZ) 8 MG TB24 Take 8 mg by mouth daily      Incontinence Supplies MISC by Does not apply route Indications: (URINARY LEG BAG)           Allergies  Allergies   Allergen Reactions    Monopril [Fosinopril Sodium] Other (See Comments)    Norvasc [Amlodipine Besylate] Other (See Comments)     unknown    Phenobarbital     Ziac GRNLOCTYABS  Last 3 Platelets  No results found for: PLATELET  Chemistry  [unfilled]  [unfilled]  No results found for: LDH  Coagulation Studies  Lab Results   Component Value Date    INR 1.51 12/06/2019     Liver Function Studies  Lab Results   Component Value Date    ALT 7 01/29/2020    AST 9 01/29/2020    ALKPHOS 89 01/29/2020       Recent Imaging        Relevant labs and imaging reviewed    ASSESSMENT AND PLAN   LEV Aquino is a 79 y.o. female p/w    Hypotension possibly d/t bradycardia or UTI, possibly d/t wound infx  - has low EF in 2015  - UA with from indwelling cath on 1/29 @ 2000  - ED gave empiric abx  - perhaps bradycardia/dehydration is contributatory to hypotension  - lactate wnl, no leukocytosis  - will admit to stepdown  - c/w IVF gentle, consider cont.  Broad spectrum if infx given repeat UA is +  - f/u UCx, bcx  - procal - added on  - ECHO and cards consult  - wound care    Recent fall, multiple wounds  - repeat CT w/o ICB  - neurochecks q4hr  - consider repeat CT or MRI if delerious  - multiple wounds, recently seen by Dr. Sydni Kaufman  - wound care      H/o Afib  - cardizem- held, eliquis  - consult cardiology for bradycardia eval    MISA possibly d/t pre-renal  - avoid nephrotoxic medication  - IVF  - monitor I/Os  - consider nephrology consult    H/o dementia, depression  - aricept  - Buspar  - paxil       Case d/w ED provider    DVT ppx: eliquis   Code status: full code  Would like a palliative care consult for Shelia Quintanilla, Internal Medicine  1/30/2020 at 1:20 AM

## 2020-01-30 NOTE — CARE COORDINATION
LSW received a call from Hospice and they will meet with pt and family tomorrow at [address]

## 2020-01-30 NOTE — ED PROVIDER NOTES
I independently examined and evaluated Lorie Hernandez [facility] 28.. In brief, 80 yo F who presents with decreasing MS, low BP from ECF. Per family, she seemed sleepier than usual yesterday. It is unclear when the low blood pressure started. No reported fevers. Berkley Floods several days ago and was seen in the ED. Does have early dementia and is not able to contribute much to the history. Focused exam revealed the patient appears well-hydrated, well-nourished. Frail elderly female. Mucous membranes are moist. Speech is clear. Breathing is unlabored. Skin is dry. Patient is alert and oriented x3. She has poor short-term recall. Cranial nerves II to XII grossly intact. Symmetric strength and intact sensation x4 extremities gait not assessed as patient is nonambulatory at baseline. .    EKG shows atrial fib with ventricular rate of 64. Normal axis with poor progression. Low QRS voltage noted. No ST elevation or depression. No acute ischemic change when compared to prior tracing. ED course: Patient is given IV fluid bolus on arrival with noted improvement in her presenting hypotension. She was noted to have a couple episodes of hypotension after this. However these were false readings due to poor cuff positioning. I personally repositioned the cuff and rechecked them. Blood pressure readings were normal at the time. Labs are reviewed. She does have acute kidney injury. No leukocytosis. H&H stable. Initial urine was collected from an indwelling catheter. Catheter was replaced and repeat urinalysis done that shows presence of infection. Urine culture and blood cultures ordered. Chest x-ray is also suggestive of pneumonia. Procalcitonin is elevated. She is covered with broad-spectrum antibiotics for healthcare associated pneumonia and UTI. Given the acute kidney injury and presenting hypotension, agree with plan to admit. Final Impression:  1. MISA (acute kidney injury) ([facility] [address]    2.  Hypotension, unspecified hypotension type    3. Confusion      DISPOSITION Admitted 01/30/2020 02:41:14 AM      All diagnostic, treatment, and disposition decisions were made by myself in conjunction with the advanced practice provider. For all further details of the patient's emergency department visit, please see the advanced practice provider's documentation. Comment: Please note this report has been produced using speech recognition software and may contain errors related to that system including errors in grammar, punctuation, and spelling, as well as words and phrases that may be inappropriate. If there are any questions or concerns please feel free to contact the dictating provider for clarification.         Daryle Cords, DO  01/30/20 1038 [facility], DO  01/30/20 1534

## 2020-01-30 NOTE — PROGRESS NOTES
Hospitalist Progress Note      Name:  Kristen Clayton /Age/Sex: 1940  (79 y.o. female)   MRN & CSN:  [id number] & [id number] Admission Date/Time: 2020  8:03 PM   Location:  [address] [place] [address] PCP: Tran Linda MD         Hospital Day: 2    Assessment and Plan:   Kristen Clayton is a 79 y.o.  female  who presents with AMS    1) Metabolic Encephalopathy 2/2 UTI due to chronic fan catheter  -UA positive for UTI  -Awaiting urine MCS   -Use IV Ceftriaxone and await senistivity     2) Recent fall, multiple wounds  -Cause likely multi factorial  - CT head: Non acute  -continue wound care and dressing     3) H/o Afib  - On cardizem but held due to bradycardia  -Cardio on board; holter ordered   -Eliquis for [facility]     4) MISA possibly d/t pre-renal  -Cr of 1.4  -IVF NS  -Will trend      H/o dementia, depression  - aricept  - Buspar  - paxil      Diet DIET CARB CONTROL; Dysphagia Pureed   DVT Prophylaxis [] Lovenox, []  Heparin, [] SCDs, [] Ambulation   GI Prophylaxis [] PPI,  [] H2 Blocker,  [] Carafate,  [] Diet/Tube Feeds   Code Status DNR-CC   Disposition SNF   [facility]      History of Present Illness:     Patient was seen and examined  Denied any chest pain, dizziness  No SOB, fever, chills      Ten point ROS reviewed negative, unless as noted above    Objective: Intake/Output Summary (Last 24 hours) at 2020 1618  Last data filed at 2020 1603  Gross per 24 hour   Intake --   Output 450 ml   Net -450 ml      Vitals:   Vitals:    20 1600   BP: 112/69   Pulse: 63   Resp: 15   Temp: 97.6 °F (36.4 °C)   SpO2:      Physical Exam:   GEN Awake female, laying in bed in no apparent distress. Appears given age. EYES Pupils are equally round. No scleral erythema, discharge, or conjunctivitis. HENT Mucous membranes are moist. Oral pharynx without exudates, no evidence of thrush. NECK Supple, no apparent thyromegaly or masses. RESP Clear to auscultation, no wheezes, rales or rhonchi.   Symmetric

## 2020-01-30 NOTE — CONSULTS
smokeless tobacco. She reports that she does not drink alcohol or use drugs. Family history:  As Reviewed family history includes Alcohol Abuse in her mother; Arrhythmia in her father; Breast Cancer in her mother; Cirrhosis in her mother; Coronary Art Dis in her father; Diabetes in her father and mother; Heart Disease in her father and mother; High Blood Pressure in her mother.     Allergies   Allergen Reactions    Monopril [Fosinopril Sodium] Other (See Comments)    Norvasc [Amlodipine Besylate] Other (See Comments)     unknown    Phenobarbital     Ziac [Bisoprolol-Hydrochlorothiazide] Other (See Comments)    Bactrim [Sulfamethoxazole-Trimethoprim] Itching    Chocolate Rash     Dark Chocolate    Penicillins Rash       apixaban (ELIQUIS) tablet 2.5 mg, BID  busPIRone (BUSPAR) tablet 5 mg, BID  donepezil (ARICEPT) tablet 10 mg, Nightly  insulin detemir (LEVEMIR) injection vial 10 Units, Nightly  glucose (GLUTOSE) 40 % oral gel 15 g, PRN  dextrose 50 % IV solution, PRN  glucagon (rDNA) injection 1 mg, PRN  dextrose 5 % solution, PRN  atorvastatin (LIPITOR) tablet 10 mg, Daily  sodium chloride flush 0.9 % injection 10 mL, 2 times per day  sodium chloride flush 0.9 % injection 10 mL, PRN  magnesium hydroxide (MILK OF MAGNESIA) 400 MG/5ML suspension 30 mL, Daily PRN  ondansetron (ZOFRAN) injection 4 mg, Q6H PRN  acetaminophen (TYLENOL) tablet 650 mg, Q4H PRN  cefepime (MAXIPIME) 2 g IVPB minibag, Q12H  acetaminophen (TYLENOL) tablet 650 mg, Q8H PRN  albuterol sulfate  (90 Base) MCG/ACT inhaler 2 puff, Q6H PRN  docusate sodium (COLACE) capsule 100 mg, Daily  ferrous sulfate tablet 325 mg, Daily with breakfast  fluticasone (FLONASE) 50 MCG/ACT nasal spray 1 spray, Daily  HYDROcodone-acetaminophen (NORCO) 5-325 MG per tablet 1 tablet, Q8H PRN  nitroGLYCERIN (NITROSTAT) SL tablet 0.4 mg, Q5 Min PRN  PARoxetine (PAXIL) tablet 20 mg, QAM  vancomycin (VANCOCIN) 1000 mg in dextrose 5% 200 mL IVPB, Q24H  trospium person  · Non-anxious    Lab Review   Recent Labs     01/29/20 2135   WBC 8.9   HGB 9.9*   HCT 33.4*         Recent Labs     01/29/20 2135   *   K 4.0   CL 95*   CO2 24   BUN 33*   CREATININE 1.4*     Recent Labs     01/29/20 2135   AST 9*   ALT 7*   BILITOT 0.5   ALKPHOS 89     No results for input(s): TROPONINI in the last 72 hours.   Lab Results   Component Value Date     (H) 09/23/2011     (H) 09/21/2011     Lab Results   Component Value Date    INR 1.51 12/06/2019    PROTIME 18.4 (H) 12/06/2019       EKG:  Atrial fibrillation   Low voltage QRS   Possible Inferior infarct (cited on or before 05-MAR-2017)   Possible Anterolateral infarct (cited on or before 05-MAR-2017)   Abnormal ECG   When compared with ECG of 06-DEC-2019 10:11,   No significant change was found    Assessment:  Patient Active Problem List   Diagnosis Code    Lymphedema of lower extremity I89.0    CAD (coronary artery disease) I25.10    Hypertension I10    COPD (chronic obstructive pulmonary disease) J44.9    Irritable bowel syndrome K58.9    Hyperlipidemia E78.5    Depression F32.9    Obstructive sleep apnea on CPAP G47.33, Z99.89    H/O cardiac catheterization Z98.890    Asthma J45.909    Incontinence of urine R32    Osteoarthritis M19.90    Carotid artery stenosis I65.29    Diabetic mononeuropathy associated with type 2 diabetes mellitus ([facility]) E11.41    Diabetic nephropathy associated with type 2 diabetes mellitus ([facility]) E11.21    Syncope and collapse R55    Chronic atrial fibrillation I48.20    SIRS due to Gram-negative infection [id number]    Vasovagal syncope R55    Controlled type 2 diabetes mellitus with diabetic mononeuropathy, with long-term current use of insulin ([facility]) E11.41, Z79.4    Memory loss R41.3    Acute kidney injury ([facility] [address] N17.9    Urinary tract infection associated with indwelling urethral catheter ([facility]) T83.511A, N39.0    Atrial fibrillation, chronic I48.20    Bradycardia R00.1    Sepsis due to Gram negative bacteria ([facility] [address] A41.50    Hematoma T14. 8XXA    Traumatic hematoma of right lower leg S80.11XA    Hypotension I95.9       Recommendations:   1. Bradycardia: previously on Cardizem for rate control but is on hold now. Will follow clinically. Check Holter. 2. Atrial Fibrillation: Chads Vasc score is 6,  on Eliquis. 3. HTN: patient has had some hypotension, medications on hold  4. Dyslipidemia: continue statins   5. DVT prophylaxis if not contraindicated.       This was a 55 minute visit. At least 50% of this time was spent face to face with the patient counseling and coordinating care. Kwaku Singh MD, 1/30/2020 1:32 PM

## 2020-01-30 NOTE — ED PROVIDER NOTES
Emergency [address] EMERGENCY DEPARTMENT    Patient: Brandy Bautista  MRN: [id number]  : 1940  Date of Evaluation: 2020  ED Provider: Herlinda Wagner PA-C    Chief Complaint       Chief Complaint   Patient presents with    Hypotension       Codey Rodriguez is a 79 y.o. female who presents to the emergency department for hypotension and hypoxia. Patient sent in from  [address]. She denies any complaints. Daughter, at bedside, states she fell out of bed 3 days ago and was seen here at that time. Had negative scans but has a hematoma to the posterior scalp. Today, family feels she has been more confused than her baseline. She has also been hypotensive with a BP in the 86S systolic. While en route to the hospital, patient's O2 sat reportedly dropped in to the 70s so she was placed on non-rebreather. Does not use oxygen at baseline. She does not feel subjectively SOB. She is on Eliquis. ROS     CONSTITUTIONAL:  Denies fever. EYES:  Denies visual changes. HEAD:  Denies headache. ENT:  Denies earache, nasal congestion, sore throat. NECK:  Denies neck pain. RESPIRATORY:  + hypoxia. CARDIOVASCULAR:  Denies chest pain. GI:  Denies nausea or vomiting. :  Denies urinary symptoms. MUSCULOSKELETAL:  Denies extremity pain or swelling. BACK:  Denies back pain. INTEGUMENT:  Denies skin changes. LYMPHATIC:  Denies lymphadenopathy. NEUROLOGIC:  + confusion. PSYCHIATRIC:  Denies SI/HI. Past History     Past Medical History:   Diagnosis Date    Allergic rhinitis     Asthma     Atrial fibrillation (HCC)     **PCP follows PT/INRs, along w/prescribing Coumadin. **    CAD (coronary artery disease)     Carotid artery stenosis 11/15/2004    Mild disease noted bilaterally- per Carotid doppler    Carpal tunnel syndrome, bilateral     S/P bilateral repair     COPD [facility]chronic obstructive pulmonary disease) [facility] [address]     Depression     Diabetes type 2, controlled ([facility]     Diabetes type 2, controlled ([facility]     Emphysema     Family history of diabetes mellitus (DM)     Mother and Father    Gastro-esophageal reflux disease with esophagitis     H/O 24 hour EKG monitoring 10/10/2001    10/10/2001-Predominant rhythm is sinus rhythm. Infrequent ventricular ectopic beats noted. Freq episodes of suprventricular ectopy noted with episodes of runs of supraventricular tachycardia and also 1 short run of atrial fibrillation.  H/O cardiac catheterization 7/21/2003, 9/26/2001 7/21/2003-No significant CAD. Cardiolite was probably false positive.  H/O cardiovascular stress test 8/3/2005, 11/11/2004, 7/10/2003,9/5/2001    8/3/2005-EF 66%. Normal exercise performance without angina or ischemic EKG changes. Cardiolite study demonstrates normal perfusion in all segments of the myocardium with an intact LV systolic function    H/O Doppler ultrasound 11/15/2004    CAROTID DOPPLER-11/15/2004-Mild degree of disease noted bilaterally. Vertebral artery has normal antegrade flow.  H/O echocardiogram 7/19/2005, 11/15/2004, 7/10/2003, 7/2001 2/51/8990- LV systolic function normal. EF =>55%. Left atrium is mildly dilated. Right ventricular systolic pressure is elevated at 40-50mmHg.     Herniation of lumbar intervertebral disc     Hyperlipidemia     Hypertension     Incontinence of urine     self cath- followed by Dr Mitzy Mckeon Irritable bowel syndrome     Memory loss     starting trial aricept 5mg daily 11/3/16    Menopause     Obesity     Obstructive sleep apnea on CPAP     since 1991    Osteoarthritis     bilateral knees    S/P colonoscopy 8/2010    Dr Demar Andersen, recheck 2015    Venous insufficiency (chronic) (peripheral)      Past Surgical History:   Procedure Laterality Date    APPENDECTOMY  1951    CARPAL TUNNEL RELEASE  1994    BIlateral    CHOLECYSTECTOMY  1952    DIAGNOSTIC CARDIAC CATH LAB PROCEDURE  7/21/2003, as needed for Wheezing    APIXABAN (ELIQUIS) 2.5 MG TABS TABLET    Take 2.5 mg by mouth 2 times daily    ATORVASTATIN (LIPITOR) 10 MG TABLET    TAKE 1 TABLET BY MOUTH DAILY. BLOOD GLUCOSE MONITORING SUPPL (ONE TOUCH ULTRA MINI) W/DEVICE KIT    1 kit by Does not apply route daily    BUSPIRONE (BUSPAR) 7.5 MG TABLET    Take 5 mg by mouth 2 times daily     CAPSAICIN-MENTHOL-METHYL SAL 0.025-1-12 % CREA    Apply topically as needed Indications: Pain Apply to neck and knees Q 12 for pain    DICLOFENAC (VOLTAREN) 25 MG EC TABLET    Take 1 tablet by mouth 2 times daily as needed for Pain    DICYCLOMINE (BENTYL) 10 MG CAPSULE    TAKE ONE CAPSULE BY MOUTH 3 TIMES A DAY AS NEEDED    DILTIAZEM (CARDIZEM CD) 120 MG EXTENDED RELEASE CAPSULE    Take 1 capsule by mouth daily    DOCUSATE SODIUM (COLACE) 100 MG CAPSULE    Take 100 mg by mouth daily    DONEPEZIL (ARICEPT) 10 MG TABLET    Take 1 tablet by mouth nightly    FERROUS SULFATE 325 (65 FE) MG TABLET    Take 325 mg by mouth daily (with breakfast)    FESOTERODINE FUMARATE ER (TOVIAZ) 8 MG TB24    Take 8 mg by mouth daily    FEXOFENADINE (ALLEGRA) 180 MG TABLET    Take 180 mg by mouth daily    FLUTICASONE (FLONASE) 50 MCG/ACT NASAL SPRAY    1 spray by Each Nostril route daily    HYDROCHLOROTHIAZIDE (HYDRODIURIL) 25 MG TABLET    TAKE 1 TABLET BY MOUTH DAILY. HYDROCODONE-ACETAMINOPHEN (NORCO) 5-325 MG PER TABLET    Take 1 tablet by mouth every 8 hours as needed for Pain . INCONTINENCE SUPPLIES (REYES CATHETER CARE) KIT    Use as directed by doctor. 24 inc with a 30 balloon.     INCONTINENCE SUPPLIES MISC    by Does not apply route Indications: (URINARY LEG BAG)    LIDOCAINE PF 1 % SOLN INJECTION    Inject 1 mL into the skin daily 1ml Lidocaine to be mixed with 0.5 g Rocephin to be injected into the muscle every 24 hours for 7 days    LOPERAMIDE (IMODIUM) 2 MG CAPSULE    Take 2 mg by mouth 4 times daily as needed for Diarrhea Indications: Loose Stools    MAGNESIUM HYDROXIDE (MILK OF MAGNESIA) 400 MG/5ML SUSPENSION    Take 10 mLs by mouth daily as needed for Constipation    MECLIZINE (ANTIVERT) 12.5 MG TABLET    Take 12.5 mg by mouth 3 times daily as needed    MENTHOL-ZINC OXIDE (CALMOSEPTINE) 0.44-20.625 % OINT OINTMENT    Apply topically daily Max 30 ml per day. Apply to buttocks topically every shift    METHOCARBAMOL (ROBAXIN) 500 MG TABLET    Take 1 tablet by mouth 2 times daily as needed (MUSCLE ACHES AND LOW BACK PAIN)    NITROGLYCERIN (NITROSTAT) 0.4 MG SL TABLET    Place 0.4 mg under the tongue every 5 minutes as needed for Chest pain    NYSTATIN (MYCOSTATIN) 602162 UNIT/GM POWDER    Apply topically 4 times daily Apply topically 4 times daily. PAROXETINE (PAXIL) 40 MG TABLET    TAKE 1 TABLET BY MOUTH EVERY MORNING    PULMICORT FLEXHALER 180 MCG/ACT AEPB INHALER    INHALE 2 PUFFS INTO THE LUNGS 2 TIMES DAILY    RANITIDINE (ZANTAC) 150 MG TABLET    TAKE 1 TABLET BY MOUTH 2 TIMES DAILY. SKIN PROTECTANTS, MISC. (ALOE VESTA PROTECTIVE) OINT OINTMENT    APPLY TO GROIN DAILY    SKIN PROTECTANTS, MISC.  EX    Apply 1 Squirt topically 3 times daily as needed    TRAMADOL (ULTRAM) 50 MG TABLET    Take 1 tablet by mouth 2 times daily as needed for Pain    VALSARTAN (DIOVAN) 160 MG TABLET    TAKE 1 TABLET BY MOUTH 2 TIMES DAILY    VESICARE 10 MG TABLET    TAKE 1 TABLET BY MOUTH DAILY     Allergies   Allergen Reactions    Monopril [Fosinopril Sodium] Other (See Comments)    Norvasc [Amlodipine Besylate] Other (See Comments)     unknown    Phenobarbital     Ziac [Bisoprolol-Hydrochlorothiazide] Other (See Comments)    Bactrim [Sulfamethoxazole-Trimethoprim] Itching    Chocolate Rash     Dark Chocolate    Penicillins Rash        Physical Exam       ED Triage Vitals [01/29/20 2005]   BP Temp Temp Source Pulse Resp SpO2 Height Weight   88/64 98.7 °F (37.1 °C) Oral 58 18 94 % 5' 8\" (1.727 m) 185 lb (83.9 kg)     GENERAL APPEARANCE:  Well-developed, well-nourished elderly limits   URINALYSIS - Abnormal; Notable for the following components:    Clarity, UA SLIGHTLY CLOUDY (*)     Blood, Urine MODERATE (*)     Leukocyte Esterase, Urine LARGE (*)     RBC, UA 24 (*)     WBC, UA 71 (*)     Bacteria, UA FEW (*)     Mucus, UA RARE (*)     All other components within normal limits   CULTURE BLOOD #1   CULTURE BLOOD #1   URINE CULTURE   LACTIC ACID, PLASMA   ICTOTEST, URINE   PROCALCITONIN   POCT GLUCOSE   POCT GLUCOSE   POCT GLUCOSE     Radiographs:  Ct Head Wo Contrast    Result Date: 1/29/2020  EXAMINATION: CT OF THE HEAD WITHOUT CONTRAST  1/29/2020 9:29 pm TECHNIQUE: CT of the head was performed without the administration of intravenous contrast. Dose modulation, iterative reconstruction, and/or weight based adjustment of the mA/kV was utilized to reduce the radiation dose to as low as reasonably achievable. COMPARISON: 01/26/2020. HISTORY: ORDERING SYSTEM PROVIDED HISTORY: lethargy TECHNOLOGIST PROVIDED HISTORY: Reason for exam:->lethargy Has a \"code stroke\" or \"stroke alert\" been called? ->No Reason for Exam: lethargy Acuity: Unknown Type of Exam: Unknown Additional signs and symptoms: Pt is a bedbound pt from [facility]. . Wound on right leg from December. Pt fell out of bed on Saturday and was seen and cleared . Pt nurse reports low BP and O2. Pt does not wear O2 . Arrived on non rebreather. After triage pt O2 is 100 on room air Relevant Medical/Surgical History: pt alert FINDINGS: BRAIN/VENTRICLES: There is no acute intracranial hemorrhage, mass effect or midline shift. No abnormal extra-axial fluid collection. The gray-white differentiation is maintained without evidence of an acute infarct. There is no evidence of hydrocephalus. Stable generalized prominence of the ventricular and cisternal spaces. Decreased attenuation in the periventricular and subcortical white matter consistent with small vessel ischemic change. Intracranial atherosclerotic vascular calcification.  ORBITS: The visualized portion of the orbits demonstrate no acute abnormality. SINUSES: The visualized paranasal sinuses and mastoid air cells demonstrate no acute abnormality. SOFT TISSUES/SKULL:  Interval decrease in size of the scalp hematoma in the right posterior parietal region. No acute calvarial abnormality. No acute intracranial abnormality. Stable mild generalized cerebral atrophy and chronic small vessel white matter ischemic changes. Ct Head Wo Contrast    Result Date: 1/26/2020  EXAMINATION: CT OF THE HEAD WITHOUT CONTRAST  1/26/2020 1:58 am TECHNIQUE: CT of the head was performed without the administration of intravenous contrast. Dose modulation, iterative reconstruction, and/or weight based adjustment of the mA/kV was utilized to reduce the radiation dose to as low as reasonably achievable. COMPARISON: 12/06/2019. HISTORY: ORDERING SYSTEM PROVIDED HISTORY: head pain TECHNOLOGIST PROVIDED HISTORY: Has a \"code stroke\" or \"stroke alert\" been called? ->No Reason for exam:->head pain FINDINGS: BRAIN/VENTRICLES: The study is compromised by patient motion. There is no acute intracranial hemorrhage, mass effect or midline shift. No abnormal extra-axial fluid collection. The gray-white differentiation is maintained without evidence of an acute infarct. There is no evidence of hydrocephalus. The cerebral sulci and ventricles are enlarged. There is low-attenuation within the periventricular white matter and deep white matter of the brain. ORBITS: The visualized portion of the orbits demonstrate no acute abnormality. SINUSES: The visualized paranasal sinuses and mastoid air cells demonstrate no acute abnormality. SOFT TISSUES/SKULL:  There is right posterior cephalohematoma. 1. Motion compromised study but there is a right cephalohematoma but no acute intracranial abnormality. 2. Diffuse cerebral atrophy with chronic small vessel ischemic disease.      Ct Cervical Spine Wo Contrast    Result Date: Course and MDM   -  Patient seen and evaluated in the emergency department. -  Triage and nursing notes reviewed and incorporated. -  Old chart records reviewed and incorporated. -  Supervising physician was Dr. Trudi Cruz.  She saw and examined patient. -  Work-up included:  See above  -  ED medications:  NS, Vancomycin, Cefepime,   -  Results discussed with patient and family. No leukocytosis. Hgb is 9.9. Cr bumped to 1.4. GFR 36. Troponin 0.011. Lactic is normal.  BNP is 3668. UA with large leuks, 24 RBCs, 71 WBCs, few bacteria. CT head without acute findings. CXR with atelectasis and small left pleural effusion. We did start broad spectrum ABX and fluids. Hypotension improved. I spoke with Dr. Mandeep Woo, hospitalist, who will admit for further management. CRITICAL CARE NOTE:  There was a high probability of clinically significant life-threatening deterioration of the patient's condition requiring my urgent intervention due to hypotension/possible sepsis/hypoxia. Telemetry monitoring, review of labs and imaging, initiation of IV fluids and ABX, consult hospitalist was performed to address this. Total critical care time is at least  45 minutes. This includes vital sign monitoring, pulse oximetry monitoring, telemetry monitoring, clinical response to the IV medications, reviewing the nursing notes, consultation time, dictation/documentation time, and interpretation of the lab work. This time excludes time spent performing procedures and separately billable procedures and family discussion time. Final Impression      1. MISA (acute kidney injury) ([facility] [address]    2. Hypotension, unspecified hypotension type    3. Confusion    4.   UTI      DISPOSITION        Mariam Valdez, [address] [place] [address]  01/30/20 0113

## 2020-01-31 NOTE — PROGRESS NOTES
thrush. NECK Supple, no apparent thyromegaly or masses. RESP Clear to auscultation, no wheezes, rales or rhonchi. Symmetric chest movement while on room air. CARDIO/VASC S1/S2 auscultated. Regular rate without appreciable murmurs, rubs, or gallops. No JVD or carotid bruits. Peripheral pulses equal bilaterally and palpable. No peripheral edema. GI Abdomen is soft without significant tenderness, masses, or guarding. Bowel sounds are normoactive. Rectal exam deferred.  No costovertebral angle tenderness. Normal appearing external genitalia. Pappas catheter is present. HEME/LYMPH No palpable cervical lymphadenopathy and no hepatosplenomegaly. No petechiae or ecchymoses. MSK No gross joint deformities. SKIN Normal coloration, warm, dry. NEURO Cranial nerves appear grossly intact, normal speech, no lateralizing weakness. PSYCH Awake, alert, oriented x 4. Affect appropriate.     Medications:   Medications:    diltiazem  30 mg Oral 3 times per day    apixaban  2.5 mg Oral BID    busPIRone  5 mg Oral BID    donepezil  10 mg Oral Nightly    insulin detemir  10 Units Subcutaneous Nightly    atorvastatin  10 mg Oral Daily    sodium chloride flush  10 mL Intravenous 2 times per day    docusate sodium  100 mg Oral Daily    ferrous sulfate  325 mg Oral Daily with breakfast    fluticasone  1 spray Each Nostril Daily    PARoxetine  20 mg Oral QAM    trospium  20 mg Oral BID AC    fluticasone  2 puff Inhalation BID    cefTRIAXone (ROCEPHIN) IV  1 g Intravenous Daily      Infusions:    dextrose      sodium chloride 125 mL/hr at 01/31/20 0940     PRN Meds: glucose, 15 g, PRN  dextrose, 12.5 g, PRN  glucagon (rDNA), 1 mg, PRN  dextrose, 100 mL/hr, PRN  sodium chloride flush, 10 mL, PRN  magnesium hydroxide, 30 mL, Daily PRN  ondansetron, 4 mg, Q6H PRN  acetaminophen, 650 mg, Q4H PRN  acetaminophen, 650 mg, Q8H PRN  albuterol sulfate HFA, 2 puff, Q6H PRN  HYDROcodone-acetaminophen, 1 tablet, Q8H PRN  nitroGLYCERIN, 0.4 mg, Q5 Min PRN          Electronically signed by Therese Castellano MD on 1/31/2020 at 1:58 PM

## 2020-01-31 NOTE — CONSULTS
frequently incopntinent. She lives a bed to chair existence. The hospice nurse liaison met with the patient's family also. Hospice philosophy was discussed regarding care and comfort at the end of life. Questions were answered, and emotional support was provided. We did discuss that at a nursing facility, hospice does not cover room and board costs. They are aware that Hospice does not provide for the patients 24 hour care giving needs. The patient is DNR-comfort care status. Past Medical History:   Diagnosis Date    Allergic rhinitis     Asthma     Atrial fibrillation (HCC)     **PCP follows PT/INRs, along w/prescribing Coumadin. **    CAD (coronary artery disease)     Carotid artery stenosis 11/15/2004    Mild disease noted bilaterally- per Carotid doppler    Carpal tunnel syndrome, bilateral     S/P bilateral repair 1994    COPD (chronic obstructive pulmonary disease) ([address]     Depression     Diabetes type 2, controlled ([address]     Diabetes type 2, controlled ([address]     Emphysema     Family history of diabetes mellitus (DM)     Mother and Father    Gastro-esophageal reflux disease with esophagitis     H/O 24 hour EKG monitoring 10/10/2001    10/10/2001-Predominant rhythm is sinus rhythm. Infrequent ventricular ectopic beats noted. Freq episodes of suprventricular ectopy noted with episodes of runs of supraventricular tachycardia and also 1 short run of atrial fibrillation.  H/O cardiac catheterization 7/21/2003, 9/26/2001 7/21/2003-No significant CAD. Cardiolite was probably false positive.  H/O cardiovascular stress test 8/3/2005, 11/11/2004, 7/10/2003,9/5/2001    8/3/2005-EF 66%. Normal exercise performance without angina or ischemic EKG changes. Cardiolite study demonstrates normal perfusion in all segments of the myocardium with an intact LV systolic function    H/O Doppler ultrasound 11/15/2004    CAROTID DOPPLER-11/15/2004-Mild degree of disease noted bilaterally.  Vertebral artery has normal antegrade flow.  H/O echocardiogram 2005, 11/15/2004, 7/10/2003, 2001/5418- LV systolic function normal. EF =>55%. Left atrium is mildly dilated. Right ventricular systolic pressure is elevated at 40-50mmHg.  Herniation of lumbar intervertebral disc     Hyperlipidemia     Hypertension     Incontinence of urine     self cath- followed by Dr Annita Munson Irritable bowel syndrome     Memory loss     starting trial aricept 5mg daily 11/3/16    Menopause     Obesity     Obstructive sleep apnea on CPAP     since     Osteoarthritis     bilateral knees    S/P colonoscopy 2010    Dr Lady Macias, recheck 2015    Venous insufficiency (chronic) (peripheral)        Past Surgical History:   Procedure Laterality Date    APPENDECTOMY      CARPAL TUNNEL RELEASE      BIlateral    CHOLECYSTECTOMY      DIAGNOSTIC CARDIAC CATH LAB PROCEDURE  2003, 2001-No significant CAD. Cardiolite was probably false positive.     INCISION AND DRAINAGE Right 2019    LEG INCISION AND DRAINAGE performed by Elisa Martinez DO at [place] [address]       Social History     Socioeconomic History    Marital status:      Spouse name: Not on file    Number of children: 2    Years of education: Not on file    Highest education level: Not on file   Occupational History    Not on file   Social Needs    Financial resource strain: Not on file    Food insecurity:     Worry: Not on file     Inability: Not on file    Transportation needs:     Medical: Not on file     Non-medical: Not on file   Tobacco Use    Smoking status: Former Smoker     Last attempt to quit: 1986     Years since quittin.1    Smokeless tobacco: Never Used   Substance and Sexual Activity    Alcohol use: No     Alcohol/week: 0.0 standard drinks    Drug use: No    Sexual activity: Not Currently   Lifestyle    Physical activity:     Days per week: Not on file     Minutes per session: Not on file    Stress: Not on file   Relationships    Social connections:     Talks on phone: Not on file     Gets together: Not on file     Attends Oriental orthodox service: Not on file     Active member of club or organization: Not on file     Attends meetings of clubs or organizations: Not on file     Relationship status: Not on file    Intimate partner violence:     Fear of current or ex partner: Not on file     Emotionally abused: Not on file     Physically abused: Not on file     Forced sexual activity: Not on file   Other Topics Concern    Not on file   Social History Narrative    Not on file       Family History   Problem Relation Age of Onset    Diabetes Mother     High Blood Pressure Mother     Cirrhosis Mother     Heart Disease Mother         CHF, pacemaker    Breast Cancer Mother     Alcohol Abuse Mother     Heart Disease Father         CHF    Coronary Art Dis Father     Arrhythmia Father         pacemaker    Diabetes Father        Allergies   Allergen Reactions    Monopril [Fosinopril Sodium] Other (See Comments)    Norvasc [Amlodipine Besylate] Other (See Comments)     unknown    Phenobarbital     Ziac [Bisoprolol-Hydrochlorothiazide] Other (See Comments)    Bactrim [Sulfamethoxazole-Trimethoprim] Itching    Chocolate Rash     Dark Chocolate    Penicillins Rash       Medication list reviewed  Prior to Admission medications    Medication Sig Start Date End Date Taking?  Authorizing Provider   acetaminophen (TYLENOL) 500 MG tablet Take 1,000 mg by mouth every 8 hours as needed for Pain    Historical Provider, MD   magnesium hydroxide (MILK OF MAGNESIA) 400 MG/5ML suspension Take 10 mLs by mouth daily as needed for Constipation    Historical Provider, MD   loperamide (IMODIUM) 2 MG capsule Take 2 mg by mouth 4 times daily as needed for Diarrhea Indications: Loose Stools    Historical Provider, MD   capsaicin-menthol-methyl sal 0.025-1-12 % CREA Apply topically as needed Indications: Pain Apply to neck and knees Q 12 for pain    Historical Provider, MD   fluticasone (FLONASE) 50 MCG/ACT nasal spray 1 spray by Each Nostril route daily    Historical Provider, MD   apixaban (ELIQUIS) 2.5 MG TABS tablet Take 2.5 mg by mouth 2 times daily    Historical Provider, MD   menthol-zinc oxide (CALMOSEPTINE) 0.44-20.625 % OINT ointment Apply topically daily Max 30 ml per day. Apply to buttocks topically every shift    Historical Provider, MD   docusate sodium (COLACE) 100 MG capsule Take 100 mg by mouth daily    Historical Provider, MD   VESICARE 10 MG tablet TAKE 1 TABLET BY MOUTH DAILY 4/17/17   Garcia Tyler MD   valsartan (DIOVAN) 160 MG tablet TAKE 1 TABLET BY MOUTH 2 TIMES DAILY 4/17/17   Garcia Tyler MD   atorvastatin (LIPITOR) 10 MG tablet TAKE 1 TABLET BY MOUTH DAILY. 4/17/17   Garcia Tyler MD   hydrochlorothiazide (HYDRODIURIL) 25 MG tablet TAKE 1 TABLET BY MOUTH DAILY. 3/20/17   Garcia Tyler MD   HYDROcodone-acetaminophen (NORCO) 5-325 MG per tablet Take 1 tablet by mouth every 8 hours as needed for Pain .  3/9/17   Garcia Tyler MD   traMADol [facility]) 50 MG tablet Take 1 tablet by mouth 2 times daily as needed for Pain 3/9/17   Garcia Tyler MD   diclofenac (VOLTAREN) 25 MG EC tablet Take 1 tablet by mouth 2 times daily as needed for Pain 3/9/17   Garcia Tyler MD   diltiazem (CARDIZEM CD) 120 MG extended release capsule Take 1 capsule by mouth daily 3/9/17   Garcia yTler MD   lidocaine PF 1 % SOLN injection Inject 1 mL into the skin daily 1ml Lidocaine to be mixed with 0.5 g Rocephin to be injected into the muscle every 24 hours for 7 days 3/9/17   Garcia Tyler MD   donepezil (ARICEPT) 10 MG tablet Take 1 tablet by mouth nightly  Patient taking differently: Take 15 mg by mouth nightly  3/9/17   Garcia Tyler MD   methocarbamol (ROBAXIN) 500 MG tablet Take 1 tablet by mouth 2 times daily as needed (MUSCLE ACHES AND LOW BACK PAIN)  Patient taking differently: Take 750 mg by mouth 2 times daily as needed (MUSCLE ACHES AND LOW BACK PAIN)  3/9/17   Curtis Hilario MD   PARoxetine (PAXIL) 40 MG tablet TAKE 1 TABLET BY MOUTH EVERY MORNING  Patient taking differently: Take 20 mg by mouth every morning Indications: Depression  2/2/17   Curtis Hilario MD   Skin Protectants, Misc. (ALOE VESTA PROTECTIVE) OINT ointment APPLY TO GROIN DAILY 1/10/17   Curtis Hilario MD   ranitidine (ZANTAC) 150 MG tablet TAKE 1 TABLET BY MOUTH 2 TIMES DAILY. 9/20/16   Curtis Hilario MD   dicyclomine (BENTYL) 10 MG capsule TAKE ONE CAPSULE BY MOUTH 3 TIMES A DAY AS NEEDED 8/10/16   Curtis Hilario MD   PULMICORT [facility] 180 MCG/ACT AEPB inhaler INHALE 2 PUFFS INTO THE LUNGS 2 TIMES DAILY 8/1/16   Curtis Hilaroi MD   Blood Glucose Monitoring Suppl (ONE TOUCH ULTRA MINI) W/DEVICE KIT 1 kit by Does not apply route daily 7/27/16   Curtis Hilario MD   Incontinence Supplies ([address] [place] [address]) KIT Use as directed by doctor. 24 inc with a 30 balloon. 7/11/16   Curtis Hilario MD   albuterol sulfate  (90 BASE) MCG/ACT inhaler Inhale 2 puffs into the lungs every 6 hours as needed for Wheezing 1/13/16   Curtis Hilario MD   nystatin (MYCOSTATIN) 075832 UNIT/GM powder Apply topically 4 times daily Apply topically 4 times daily. Historical Provider, MD   SKIN PROTECTANTS, MISC.  EX Apply 1 Squirt topically 3 times daily as needed    Historical Provider, MD   ferrous sulfate 325 (65 FE) MG tablet Take 325 mg by mouth daily (with breakfast)    Historical Provider, MD   fexofenadine (ALLEGRA) 180 MG tablet Take 180 mg by mouth daily    Historical Provider, MD   meclizine (ANTIVERT) 12.5 MG tablet Take 12.5 mg by mouth 3 times daily as needed    Historical Provider, MD   busPIRone (BUSPAR) 7.5 MG tablet Take 5 mg by mouth 2 times daily     Historical Provider, MD   nitroGLYCERIN

## 2020-01-31 NOTE — PROGRESS NOTES
Cardiology Progress Note     Today's Plan: Cardizem 30 mg TID    Admit Date:  1/29/2020    Consult reason/ Seen today for: bradycardia/a-fib    Subjective and  Overnight Events:  Heart rate is now elevated in low 100's. Patient denies chest pain, no shortness of breath. Assessment / Plan / Recommendation:     1. Bradycardia: Check Holter monitor, restart Cardizem at 30 mg TID, bradycardia appears to be resolved. In fact Patient is tachycardic now. 2. Atrial Fibrillation: Chads Vasc score is 6,  on Eliquis. Rate control with Cardizem, will continue to titrate to affect. 3. HTN: Stable, will monitor with starting Cardizem and continue to evaluate. 4. Dyslipidemia: continue statins   5. DVT prophylaxis if not contraindicated.      History of Presenting Illness:    Chief complain on admission : 79 y. o.year old who is admitted for  Chief Complaint   Patient presents with    Hypotension        Past medical history:    has a past medical history of Allergic rhinitis, Asthma, Atrial fibrillation ([address] CAD (coronary artery disease), Carotid artery stenosis, Carpal tunnel syndrome, bilateral, COPD (chronic obstructive pulmonary disease) ([address] Depression, Diabetes type 2, controlled ([address] Diabetes type 2, controlled ([address] Emphysema, Family history of diabetes mellitus (DM), Gastro-esophageal reflux disease with esophagitis, H/O 24 hour EKG monitoring, H/O cardiac catheterization, H/O cardiovascular stress test, H/O Doppler ultrasound, H/O echocardiogram, Herniation of lumbar intervertebral disc, Hyperlipidemia, Hypertension, Incontinence of urine, Irritable bowel syndrome, Memory loss, Menopause, Obesity, Obstructive sleep apnea on CPAP, Osteoarthritis, S/P colonoscopy, and Venous insufficiency (chronic) (peripheral). Past surgical history:   has a past surgical history that includes Carpal tunnel release (1994);  Nasal polyp tenderness  Musculoskeletal:  Intact distal pulses, no edema, No tenderness, No cyanosis, No clubbing. Integument:  Warm, Dry  Lymphatic:  No lymphadenopathy noted. Neurologic:  Alert & oriented  Psychiatric:  Affect and Mood :pleasant     Medications:    diltiazem  30 mg Oral 3 times per day    magnesium sulfate  2 g Intravenous Once    apixaban  2.5 mg Oral BID    busPIRone  5 mg Oral BID    donepezil  10 mg Oral Nightly    insulin detemir  10 Units Subcutaneous Nightly    atorvastatin  10 mg Oral Daily    sodium chloride flush  10 mL Intravenous 2 times per day    docusate sodium  100 mg Oral Daily    ferrous sulfate  325 mg Oral Daily with breakfast    fluticasone  1 spray Each Nostril Daily    PARoxetine  20 mg Oral QAM    trospium  20 mg Oral BID AC    fluticasone  2 puff Inhalation BID    cefTRIAXone (ROCEPHIN) IV  1 g Intravenous Daily      dextrose      sodium chloride 125 mL/hr at 01/31/20 0940     glucose, dextrose, glucagon (rDNA), dextrose, sodium chloride flush, magnesium hydroxide, ondansetron, acetaminophen, acetaminophen, albuterol sulfate HFA, HYDROcodone-acetaminophen, nitroGLYCERIN    Lab Data:  CBC:   Recent Labs     01/29/20 2135 01/31/20  0600   WBC 8.9 11.5*   HGB 9.9* 9.8*   HCT 33.4* 32.9*   .0 99.1    420     BMP:   Recent Labs     01/29/20 2135 01/31/20  0600   * 135   K 4.0 3.7   CL 95* 100   CO2 24 19*   BUN 33* 31*   CREATININE 1.4* 1.1     PT/INR: No results for input(s): PROTIME, INR in the last 72 hours. BNP:    Recent Labs     01/29/20 2135   PROBNP 3,668*     TROPONIN:   Recent Labs     01/29/20 2135   TROPONINT 0.011*        ECHO :   EF 45-50%, mild LVH, mild mitral valve calcification, mild tricuspid regurgitation. NM Myocardial Spect  Normal study -3/6/17    Impression:  Active Problems:    Hypotension    MISA (acute kidney injury) ([facility]  Resolved Problems:    * No resolved hospital problems.  *       All labs, medications and tests reviewed by myself, continue all other medications of all above medical condition listed as is except for changes mentioned above. Thank you   Please call with questions. Electronically signed by Mirella Santana. COLLEEN Mckeon CNP on 1/31/2020 at 12:03 PM   I have seen ,spoken to  and examined this patient personally, independently of the nurse practitioner. I have reviewed the hospital care given to date and reviewed all pertinent labs and imaging. The plan was developed mutually at the time of the visit with the patient, Clinical Nurse Practitioner  and myself. I have spoken with patient, nursing staff and provided written and verbal instructions . The above note has been reviewed and I agree with the assessment, diagnosis, and treatment plan with changes made by me as follows     CARDIOLOGY ATTENDING ADDENDUM    HPI:  I have reviewed the above HPI  And agree with above   Kalie Glez is a 79 y. o.year old who and presents with had concerns including Hypotension. Chief Complaint   Patient presents with    Hypotension     Interval history:  Chest wall tenderness    Physical Exam:  General:  Awake, alert, NAD  Head:normal  Eye:normal  Neck:  No JVD   Chest:  Clear to auscultation, respiration easy  Cardiovascular:  RRR S1S2  Abdomen:   nontender  Extremities:  minimal edema  Pulses; palpable  Neuro: grossly normal      MEDICAL DECISION MAKING;    I agree with the above plan, which was planned by myself and discussed with NP.     Vince Butterfield MD [facility] [place]

## 2020-02-01 NOTE — PLAN OF CARE
Problem: Falls - Risk of:  Goal: Will remain free from falls  Description  Will remain free from falls  2/1/2020 1554 by Asia Peters  Outcome: Ongoing  2/1/2020 1125 by Tiffani Bae RN  Outcome: Ongoing  Goal: Absence of physical injury  Description  Absence of physical injury  2/1/2020 1554 by Asia Peters  Outcome: Ongoing  2/1/2020 1125 by Tiffani Bae RN  Outcome: Ongoing     Problem: Risk for Impaired Skin Integrity  Goal: Tissue integrity - skin and mucous membranes  Description  Structural intactness and normal physiological function of skin and  mucous membranes.   2/1/2020 1554 by Asia Peters  Outcome: Ongoing  2/1/2020 1125 by Tiffani Bae RN  Outcome: Ongoing

## 2020-02-01 NOTE — PROGRESS NOTES
membranes are moist. Oral pharynx without exudates, no evidence of thrush. NECK Supple, no apparent thyromegaly or masses. RESP Clear to auscultation, no wheezes, rales or rhonchi. Symmetric chest movement while on room air. CARDIO/VASC S1/S2 auscultated. Regular rate without appreciable murmurs, rubs, or gallops. No JVD or carotid bruits. Peripheral pulses equal bilaterally and palpable. No peripheral edema. GI Abdomen is soft without significant tenderness, masses, or guarding. Bowel sounds are normoactive. Rectal exam deferred.  No costovertebral angle tenderness. Normal appearing external genitalia. Pappas catheter is present. HEME/LYMPH No palpable cervical lymphadenopathy and no hepatosplenomegaly. No petechiae or ecchymoses. MSK No gross joint deformities. SKIN Normal coloration, warm, dry. NEURO Cranial nerves appear grossly intact, normal speech, no lateralizing weakness. PSYCH Awake, alert, oriented x 4. Affect appropriate.     Medications:   Medications:    miconazole   Topical BID    diltiazem  30 mg Oral 3 times per day    cefepime  2 g Intravenous Q12H    apixaban  2.5 mg Oral BID    busPIRone  5 mg Oral BID    donepezil  10 mg Oral Nightly    insulin detemir  10 Units Subcutaneous Nightly    atorvastatin  10 mg Oral Daily    sodium chloride flush  10 mL Intravenous 2 times per day    docusate sodium  100 mg Oral Daily    ferrous sulfate  325 mg Oral Daily with breakfast    fluticasone  1 spray Each Nostril Daily    PARoxetine  20 mg Oral QAM    trospium  20 mg Oral BID AC    fluticasone  2 puff Inhalation BID      Infusions:    dextrose       PRN Meds: glucose, 15 g, PRN  dextrose, 12.5 g, PRN  glucagon (rDNA), 1 mg, PRN  dextrose, 100 mL/hr, PRN  sodium chloride flush, 10 mL, PRN  magnesium hydroxide, 30 mL, Daily PRN  ondansetron, 4 mg, Q6H PRN  acetaminophen, 650 mg, Q4H PRN  acetaminophen, 650 mg, Q8H PRN  albuterol sulfate HFA, 2 puff, Q6H PRN  HYDROcodone-acetaminophen, 1 tablet, Q8H PRN  nitroGLYCERIN, 0.4 mg, Q5 Min PRN          Electronically signed by Therese Castellano MD on 2/1/2020 at 1:19 PM

## 2020-02-01 NOTE — PROGRESS NOTES
Patient has not had a bowel movement since admission which was 1/29/2020. Unsure when her last bm was at facility. Abdomen distended, non-tender, hypoactive bowel sounds, has not ate well today. Nurse called doctor for order for soap suds enema. 500 ml of soap arnol enema administered. Got large amount of hard, green colored stool. Had to rectal stimulate. After procedure patient's blood pressure dropped to 83/53, pulse 100s. Placed in [address] Blood pressure back up to 95/69. Patient has external Hemmoroids. Moderate amount of bleeding noted. Right questionable swollen area to abdomen appears softer and not as enlarged. New order to give Mag citrate. Doctor did not want a stool culture. Patient cleaned up and cream was applied to red bottom. Re-paged doctor to see if he wants to get a KUB. Patient tolerated procedure.

## 2020-02-01 NOTE — PROGRESS NOTES
are equal, round, and reactive to light. Neck:      Musculoskeletal: Normal range of motion and neck supple. Vascular: No carotid bruit. Cardiovascular:      Rate and Rhythm: Tachycardia present. Rhythm irregular. Pulses: Normal pulses. Heart sounds: Normal heart sounds. Pulmonary:      Effort: Pulmonary effort is normal.      Breath sounds: Normal breath sounds. Abdominal:      General: Bowel sounds are normal.      Palpations: Abdomen is soft. Musculoskeletal:         General: No swelling. Skin:     Capillary Refill: Capillary refill takes less than 2 seconds. Findings: Wound (R lower leg dressing in place. ) present. Comments: Vascular discoloration to bilateral lower legs. Neurological:      Mental Status: She is alert. Mental status is at baseline. She is disoriented.    Psychiatric:         Mood and Affect: Mood normal.         Behavior: Behavior normal.       Medications:    diltiazem  30 mg Oral 3 times per day    cefepime  2 g Intravenous Q12H    apixaban  2.5 mg Oral BID    busPIRone  5 mg Oral BID    donepezil  10 mg Oral Nightly    insulin detemir  10 Units Subcutaneous Nightly    atorvastatin  10 mg Oral Daily    sodium chloride flush  10 mL Intravenous 2 times per day    docusate sodium  100 mg Oral Daily    ferrous sulfate  325 mg Oral Daily with breakfast    fluticasone  1 spray Each Nostril Daily    PARoxetine  20 mg Oral QAM    trospium  20 mg Oral BID AC    fluticasone  2 puff Inhalation BID      dextrose       glucose, dextrose, glucagon (rDNA), dextrose, sodium chloride flush, magnesium hydroxide, ondansetron, acetaminophen, acetaminophen, albuterol sulfate HFA, HYDROcodone-acetaminophen, nitroGLYCERIN    Lab Data:  CBC:   Recent Labs     01/29/20 2135 01/31/20  0600   WBC 8.9 11.5*   HGB 9.9* 9.8*   HCT 33.4* 32.9*   .0 99.1    420     BMP:   Recent Labs     01/29/20 2135 01/31/20  0600   * 135   K 4.0 3.7   CL 95* 100 CO2 24 19*   BUN 33* 31*   CREATININE 1.4* 1.1     PT/INR: No results for input(s): PROTIME, INR in the last 72 hours. BNP:    Recent Labs     01/29/20 2135   PROBNP 3,668*     TROPONIN:   Recent Labs     01/29/20 2135   TROPONINT 0.011*        ECHO :   EF 45-50%, mild LVH, mild mitral valve calcification, mild tricuspid regurgitation. NM Myocardial Spect  Normal study -3/6/17    Impression:  Active Problems:    Hypotension    MISA (acute kidney injury) ([address]  Resolved Problems:    * No resolved hospital problems. *       All labs, medications and tests reviewed by myself, continue all other medications of all above medical condition listed as is except for changes mentioned above. Thank you   Please call with questions. Electronically signed by COLLEEN Carmona CNP on 2/1/2020 at 10:14 AM     [facility]    I have seen ,spoken to  and examined this patient personally, independently of the nurse practitioner. I have reviewed the hospital care given to date and reviewed all pertinent labs and imaging. The plan was developed mutually at the time of the visit with the patient,  NP   and myself. I have spoken with patient, nursing staff and provided written and verbal instructions . The above note has been reviewed and I agree with the assessment, diagnosis, and treatment plan with changes made by me as follows     HPI:  I have reviewed the above HPI  And agree with above   Esperanza Mckeon is a 79 y. o.year old who and presents with had concerns including Hypotension.   Chief Complaint   Patient presents with    Hypotension     Please review addendum/changes made to note above   Interval history:  afib but rate control    Physical Exam:  General:  Awake, alert, NAD  Head:normal  Eye:normal  Neck:  No JVD   Chest:  Clear to auscultation, respiration easy  Cardiovascular:  S1 and S2 audible, No added heart sounds, No signs of ankle edema, or volume overload, No evidence of JVD, No crackles  Abdomen:   nontender  Extremities:  No  edema  Pulses; palpable  Neuro: grossly normal      MEDICAL DECISION MAKING;    I agree with the above plan, which was planned by myself and discussed with NP.   Andre Barraza MD [facility] [place]

## 2020-02-02 NOTE — PROGRESS NOTES
01/31/20  0600 02/01/20  2126 02/02/20  0349   WBC 11.5*  --  12.2*   HGB 9.8* 9.8* 9.4*   HCT 32.9* 32.8* 31.6*   MCV 99.1  --  97.8     --  391     BMP:   Recent Labs     01/31/20  0600 02/02/20  0349    133*   K 3.7 3.9    99   CO2 19* 20*   BUN 31* 46*   CREATININE 1.1 1.2*     PT/INR: No results for input(s): PROTIME, INR in the last 72 hours. BNP:    No results for input(s): PROBNP in the last 72 hours. TROPONIN:   No results for input(s): TROPONINT in the last 72 hours. ECHO :   EF 45-50%, mild LVH, mild mitral valve calcification, mild tricuspid regurgitation. NM Myocardial Spect  Normal study -3/6/17    Impression:  Active Problems:    Hypotension    MISA (acute kidney injury) ([facility] [address]  Resolved Problems:    * No resolved hospital problems. *       All labs, medications and tests reviewed by myself, continue all other medications of all above medical condition listed as is except for changes mentioned above. Thank you   Please call with questions. Electronically signed by COLLEEN He CNP on 2/2/2020 at 11:15 AM     CARDIOLOGY ATTENDING ADDENDUM    I have seen ,spoken to  and examined this patient personally, independently of the nurse practitioner. I have reviewed the hospital care given to date and reviewed all pertinent labs and imaging. The plan was developed mutually at the time of the visit with the patient,  NP   and myself. I have spoken with patient, nursing staff and provided written and verbal instructions . The above note has been reviewed and I agree with the assessment, diagnosis, and treatment plan with changes made by me as follows     HPI:  I have reviewed the above HPI  And agree with above   Janelle Kang is a 79 y. o.year old who and presents with had concerns including Hypotension.   Chief Complaint   Patient presents with    Hypotension     Please review addendum/changes made to note above   Interval history: IM absolutely    Physical Exam:  General: Awake, alert, NAD  Head:normal  Eye:normal  Neck:  No JVD   Chest:  Clear to auscultation, respiration easy  Cardiovascular:  S1 and S2 audible, No added heart sounds, No signs of ankle edema, or volume overload, No evidence of JVD, No crackles  Abdomen:   nontender  Extremities:  No   edema  Pulses; palpable  Neuro: grossly normal      MEDICAL DECISION MAKING;    I agree with the above plan, which was planned by myself and discussed with NP.   Afib: 60 mg qid        Vin Omalley MD [facility] [place] came in with syncopen

## 2020-02-02 NOTE — PROGRESS NOTES
Bedside nurse reports distended abdomen, right abdominal hematoma and a reported drop in BP to 83/53, Pulse of 100. Will obtain a non-contrast CT abdomen. Stat H and H. Continue fall prec. Patient with a hx of recent fall.

## 2020-02-02 NOTE — PROGRESS NOTES
Physical Exam:   GEN Awake female, laying in bed in no apparent distress. Appears given age. EYES Pupils are equally round. No scleral erythema, discharge, or conjunctivitis. HENT Mucous membranes are moist. Oral pharynx without exudates, no evidence of thrush. NECK Supple, no apparent thyromegaly or masses. RESP Clear to auscultation, no wheezes, rales or rhonchi. Symmetric chest movement while on room air. CARDIO/VASC S1/S2 auscultated. Regular rate without appreciable murmurs, rubs, or gallops. No JVD or carotid bruits. Peripheral pulses equal bilaterally and palpable. No peripheral edema. GI Abdomen is soft without significant tenderness, masses, or guarding. Bowel sounds are normoactive. Rectal exam deferred.  No costovertebral angle tenderness. Normal appearing external genitalia. Pappas catheter is present. HEME/LYMPH No palpable cervical lymphadenopathy and no hepatosplenomegaly. No petechiae or ecchymoses. MSK No gross joint deformities. SKIN Normal coloration, warm, dry. NEURO Cranial nerves appear grossly intact, normal speech, no lateralizing weakness. PSYCH Awake, alert, oriented x 4. Affect appropriate.     Medications:   Medications:    senna  1 tablet Oral BID    miconazole   Topical BID    diltiazem  30 mg Oral 3 times per day    cefepime  2 g Intravenous Q12H    apixaban  2.5 mg Oral BID    busPIRone  5 mg Oral BID    donepezil  10 mg Oral Nightly    insulin detemir  10 Units Subcutaneous Nightly    atorvastatin  10 mg Oral Daily    sodium chloride flush  10 mL Intravenous 2 times per day    docusate sodium  100 mg Oral Daily    ferrous sulfate  325 mg Oral Daily with breakfast    fluticasone  1 spray Each Nostril Daily    PARoxetine  20 mg Oral QAM    trospium  20 mg Oral BID AC    fluticasone  2 puff Inhalation BID      Infusions:    dextrose       PRN Meds: glucose, 15 g, PRN  dextrose, 12.5 g, PRN  glucagon (rDNA), 1 mg, PRN  dextrose, 100 mL/hr, PRN  sodium

## 2020-02-03 NOTE — PROGRESS NOTES
Nutrition Assessment    Type and Reason for Visit: Initial, Positive Nutrition Screen(wt loss, poor intake, dificulty swallowing)    Nutrition Recommendations:   · Will cancel Carb Control 4 2/2 A1C 4.7  · Continue standard high meka oral nutrition supplement, between meals  · Assist with meals  · If intake does not improve, consider need for tube feeding if consistent with goals of care    Nutrition Assessment: Pt with UTI/AMS/dementia and open wounds. On a pureed diet, total assist with meals. Poor intake so far here, consuming less than 25%. Refused breakfast this morning. Malnutrition Assessment:  · Malnutrition Status:  At risk for malnutrition    Nutrition Risk Level: High    Nutrient Needs:  · Estimated Daily Total Kcal: 6195-3534 (Scribner-St Jeor)  · Estimated Daily Protein (g): 76-89 (1.2-1.4 g/kg IBW)  · Estimated Daily Total Fluid (ml/day): 0941-5649 (1 ml/kcal)    Nutrition Diagnosis:   · Problem: Inadequate oral intake  · Etiology: related to Cognitive or neurological impairment, Difficulty swallowing, Constipation     Signs and symptoms:  as evidenced by Intake 0-25%, Diet history of poor intake, Presence of wounds    Objective Information:  · Wound Type: Open Wounds  · Current Nutrition Therapies:  · Oral Diet Orders: Dysphagia Pureed (Dysphagia 1), Carb Control 4 Carbs/Meal   · Oral Diet intake: Refusing to eat, 0%, 1-25%  · Oral Nutrition Supplement (ONS) Orders: Standard High Calorie Oral Supplement  · ONS intake: Unable to assess  · Anthropometric Measures:  · Ht: 5' 8\" (172.7 cm)   · Current Body Wt: 184 lb 1.6 oz (83.5 kg)  · Admission Body Wt: 177 lb 1.6 oz (80.3 kg)  · Usual Body Wt: 184 lb 1.4 oz (83.5 kg)(12/20/19)  · % Weight Change:   no recent change noted  · Ideal Body Wt: 140 lb (63.5 kg), % Ideal Body 131  · BMI Classification: BMI 25.0 - 29.9 Overweight(28.1)    Nutrition Interventions:   Modify current diet, Continue current ONS  Continued Inpatient Monitoring, Education Not

## 2020-02-03 NOTE — PROGRESS NOTES
(peripheral). Past surgical history:   has a past surgical history that includes Carpal tunnel release (1994); Nasal polyp surgery (1951); Appendectomy (1951); Cholecystectomy (1952); lipoma resection (1976); Diagnostic Cardiac Cath Lab Procedure (7/21/2003, 9/26/2001); and incision and drainage (Right, 12/6/2019). Social History:   reports that she quit smoking about 34 years ago. She has never used smokeless tobacco. She reports that she does not drink alcohol or use drugs. Family history:  family history includes Alcohol Abuse in her mother; Arrhythmia in her father; Breast Cancer in her mother; Cirrhosis in her mother; Coronary Art Dis in her father; Diabetes in her father and mother; Heart Disease in her father and mother; High Blood Pressure in her mother. Allergies   Allergen Reactions    Monopril [Fosinopril Sodium] Other (See Comments)    Norvasc [Amlodipine Besylate] Other (See Comments)     unknown    Phenobarbital     Ziac [Bisoprolol-Hydrochlorothiazide] Other (See Comments)    Bactrim [Sulfamethoxazole-Trimethoprim] Itching    Chocolate Rash     Dark Chocolate    Penicillins Rash       Review of Systems:   All 14 systems were reviewed and are negative  Except for the positive findings which are documented     /65   Pulse 94   Temp 97.6 °F (36.4 °C) (Axillary)   Resp 14   Ht 5' 8\" (1.727 m)   Wt 184 lb 1.6 oz (83.5 kg)   SpO2 94%   BMI 27.99 kg/m²       Intake/Output Summary (Last 24 hours) at 2/3/2020 1307  Last data filed at 2/2/2020 1800  Gross per 24 hour   Intake 170 ml   Output 200 ml   Net -30 ml       Physical Exam:  Constitutional:  No acute distress  HENT:  Normocephalic, Atraumatic, Bilateral external ears normal,  Nose normal.   Neck- trachea is midline  Eyes:  PEERL, Conjunctiva normal  Respiratory:  decreased breath sounds, No respiratory distress, No wheezing, No chest tenderness.    Cardiovascular:  IRRR, no murmurs appreciated, No rubs appreciated, No gallops myself, continue all other medications of all above medical condition listed as is except for changes mentioned above. Thank you   Please call with questions. Electronically signed by COLLEEN Heredia CNP on 2/3/2020 at 1:07 PM     [address] [facility]    I have seen ,spoken to  and examined this patient personally, independently of the nurse practitioner. I have reviewed the hospital care given to date and reviewed all pertinent labs and imaging. The plan was developed mutually at the time of the visit with the patient,  NP   and myself. I have spoken with patient, nursing staff and provided written and verbal instructions . The above note has been reviewed and I agree with the assessment, diagnosis, and treatment plan with changes made by me as follows     HPI:  I have reviewed the above HPI  And agree with above   Lalito Linton is a 79 y. o.year old who and presents with had concerns including Hypotension. Chief Complaint   Patient presents with    Hypotension     Please review addendum/changes made to note above   Interval history:  Poor po intake , does not interact much    Physical Exam:  General:  Awake, alert, NAD  Head:normal  Eye:normal  Neck:  No JVD   Chest:  Clear to auscultation, respiration easy  Cardiovascular:  S1 and S2 audible, No added heart sounds, No signs of ankle edema, or volume overload, No evidence of JVD, No crackles  Abdomen:   nontender  Extremities:  No  edema  Pulses; palpable  Neuro: grossly normal      MEDICAL DECISION MAKING;    I agree with the above plan, which was planned by myself and discussed with NP.   Not doing well  Does not interact much  Poor po intake         Natalya Mackay MD [facility] [place]

## 2020-02-03 NOTE — PROGRESS NOTES
Inhalation BID      Infusions:    sodium chloride      dextrose       PRN Meds: glucose, 15 g, PRN  dextrose, 12.5 g, PRN  glucagon (rDNA), 1 mg, PRN  dextrose, 100 mL/hr, PRN  sodium chloride flush, 10 mL, PRN  magnesium hydroxide, 30 mL, Daily PRN  ondansetron, 4 mg, Q6H PRN  acetaminophen, 650 mg, Q4H PRN  acetaminophen, 650 mg, Q8H PRN  albuterol sulfate HFA, 2 puff, Q6H PRN  [Held by provider] HYDROcodone-acetaminophen, 1 tablet, Q8H PRN  nitroGLYCERIN, 0.4 mg, Q5 Min PRN          Electronically signed by Krystina Bell MD on 2/3/2020 at 1:02 PM

## 2020-02-03 NOTE — CONSULTS
[Fosinopril Sodium] Other (See Comments)    Norvasc [Amlodipine Besylate] Other (See Comments)     unknown    Phenobarbital     Ziac [Bisoprolol-Hydrochlorothiazide] Other (See Comments)    Bactrim [Sulfamethoxazole-Trimethoprim] Itching    Chocolate Rash     Dark Chocolate    Penicillins Rash       MEDICATIONS    No current facility-administered medications on file prior to encounter. Current Outpatient Medications on File Prior to Encounter   Medication Sig Dispense Refill    insulin detemir (LEVEMIR) 100 UNIT/ML injection vial Inject 10 Units into the skin nightly      losartan (COZAAR) 50 MG tablet Take 50 mg by mouth daily      acetaminophen (TYLENOL) 500 MG tablet Take 1,000 mg by mouth every 8 hours as needed for Pain      magnesium hydroxide (MILK OF MAGNESIA) 400 MG/5ML suspension Take 10 mLs by mouth daily as needed for Constipation      loperamide (IMODIUM) 2 MG capsule Take 2 mg by mouth 4 times daily as needed for Diarrhea Indications: Loose Stools      capsaicin-menthol-methyl sal 0.025-1-12 % CREA Apply topically as needed Indications: Pain Apply to neck and knees Q 12 for pain      fluticasone (FLONASE) 50 MCG/ACT nasal spray 1 spray by Each Nostril route daily      apixaban (ELIQUIS) 2.5 MG TABS tablet Take 2.5 mg by mouth 2 times daily      docusate sodium (COLACE) 100 MG capsule Take 100 mg by mouth daily      VESICARE 10 MG tablet TAKE 1 TABLET BY MOUTH DAILY 30 tablet 3    atorvastatin (LIPITOR) 10 MG tablet TAKE 1 TABLET BY MOUTH DAILY. 30 tablet 2    hydrochlorothiazide (HYDRODIURIL) 25 MG tablet TAKE 1 TABLET BY MOUTH DAILY. 30 tablet 2    HYDROcodone-acetaminophen (NORCO) 5-325 MG per tablet Take 1 tablet by mouth every 8 hours as needed for Pain .  30 tablet 0    traMADol (ULTRAM) 50 MG tablet Take 1 tablet by mouth 2 times daily as needed for Pain 30 tablet 0    diclofenac (VOLTAREN) 25 MG EC tablet Take 1 tablet by mouth 2 times daily as needed for Pain 30 tablet 0    diltiazem (CARDIZEM CD) 120 MG extended release capsule Take 1 capsule by mouth daily 30 capsule 3    donepezil (ARICEPT) 10 MG tablet Take 1 tablet by mouth nightly (Patient taking differently: Take 15 mg by mouth nightly ) 30 tablet 3    methocarbamol (ROBAXIN) 500 MG tablet Take 1 tablet by mouth 2 times daily as needed (MUSCLE ACHES AND LOW BACK PAIN) (Patient taking differently: Take 750 mg by mouth 2 times daily as needed (MUSCLE ACHES AND LOW BACK PAIN) ) 30 tablet 0    PARoxetine (PAXIL) 40 MG tablet TAKE 1 TABLET BY MOUTH EVERY MORNING (Patient taking differently: Take 40 mg by mouth every morning Indications: Depression ) 30 tablet 2    ranitidine (ZANTAC) 150 MG tablet TAKE 1 TABLET BY MOUTH 2 TIMES DAILY. 60 tablet 5    dicyclomine (BENTYL) 10 MG capsule TAKE ONE CAPSULE BY MOUTH 3 TIMES A DAY AS NEEDED 120 capsule 3    PULMICORT FLEXHALER 180 MCG/ACT AEPB inhaler INHALE 2 PUFFS INTO THE LUNGS 2 TIMES DAILY 1 each 5    Blood Glucose Monitoring Suppl (ONE TOUCH ULTRA MINI) W/DEVICE KIT 1 kit by Does not apply route daily 1 kit 0    Incontinence Supplies (Melvin CATHETER CARE) KIT Use as directed by doctor. 24 inc with a 30 balloon. 1 kit 3    albuterol sulfate  (90 BASE) MCG/ACT inhaler Inhale 2 puffs into the lungs every 6 hours as needed for Wheezing 3 Inhaler 1    nystatin (MYCOSTATIN) 816570 UNIT/GM powder Apply topically 4 times daily Apply topically 4 times daily.  SKIN PROTECTANTS, MISC.  EX Apply 1 Squirt topically 3 times daily as needed      ferrous sulfate 325 (65 FE) MG tablet Take 325 mg by mouth daily (with breakfast)      fexofenadine (ALLEGRA) 180 MG tablet Take 180 mg by mouth daily      meclizine (ANTIVERT) 12.5 MG tablet Take 12.5 mg by mouth 3 times daily as needed      busPIRone (BUSPAR) 7.5 MG tablet Take 5 mg by mouth 2 times daily       nitroGLYCERIN (NITROSTAT) 0.4 MG SL tablet Place 0.4 mg under the tongue every 5 minutes as needed for Chest pain  Fesoterodine Fumarate ER (TOVIAZ) 8 MG TB24 Take 8 mg by mouth daily      Incontinence Supplies MISC by Does not apply route Indications: (URINARY LEG BAG)           Objective:      /65   Pulse 94   Temp 97.6 °F (36.4 °C) (Axillary)   Resp 14   Ht 5' 8\" (1.727 m)   Wt 184 lb 1.6 oz (83.5 kg)   SpO2 94%   BMI 27.99 kg/m²   Alvaro Risk Score: Alvaro Scale Score: 11    LABS    CBC:   Lab Results   Component Value Date    WBC 10.1 02/03/2020    RBC 3.17 02/03/2020    HGB 9.3 02/03/2020    HCT 31.3 02/03/2020    MCV 98.7 02/03/2020    MCH 29.3 02/03/2020    MCHC 29.7 02/03/2020    RDW 13.7 02/03/2020     02/03/2020    MPV 9.4 02/03/2020     CMP:    Lab Results   Component Value Date     02/03/2020    K 4.0 02/03/2020     02/03/2020    CO2 21 02/03/2020    BUN 46 02/03/2020    CREATININE 1.3 02/03/2020    GFRAA 48 02/03/2020    GFRAA >60 01/04/2013    AGRATIO 1.4 10/14/2015    LABGLOM 39 02/03/2020    LABGLOM 56 11/08/2013    GLUCOSE 91 02/03/2020    PROT 5.5 01/31/2020    PROT 6.9 01/04/2013    LABALBU 3.3 01/31/2020    CALCIUM 8.2 02/03/2020    BILITOT 0.5 01/31/2020    ALKPHOS 100 01/31/2020    AST 10 01/31/2020    ALT 6 01/31/2020     Albumin:    Lab Results   Component Value Date    LABALBU 3.3 01/31/2020     PT/INR:    Lab Results   Component Value Date    PROTIME 18.4 12/06/2019    PROTIME 18.4 11/20/2015    INR 1.51 12/06/2019     HgBA1c:    Lab Results   Component Value Date    LABA1C 4.7 12/07/2019         Assessment:     Patient Active Problem List   Diagnosis    Lymphedema of lower extremity    CAD (coronary artery disease)    Hypertension    COPD (chronic obstructive pulmonary disease)    Irritable bowel syndrome    Hyperlipidemia    Depression    Obstructive sleep apnea on CPAP    H/O cardiac catheterization    Asthma    Incontinence of urine    Osteoarthritis    Carotid artery stenosis    Diabetic mononeuropathy associated with type 2 diabetes mellitus Tunneling Position ___ O'Clock 0 2/3/2020 11:30 AM   Undermining Starts ___ O'Clock 0 2/3/2020 11:30 AM   Undermining Ends___ O'Clock 0 2/3/2020 11:30 AM   Undermining Maxium Distance (cm) 0 2/3/2020 11:30 AM   Wound Assessment Red;Pink 2/3/2020 11:30 AM   Drainage Amount Scant 2/3/2020 11:30 AM   Drainage Description Serosanguinous 2/3/2020 11:30 AM   Odor None 2/3/2020 11:30 AM   Margins Defined edges 2/3/2020 11:30 AM   Siomara-wound Assessment Purple 2/3/2020 11:30 AM   Non-staged Wound Description Full thickness 2/3/2020 11:30 AM   Verdigre%Wound Bed 50 2/3/2020 11:30 AM   Red%Wound Bed 50 2/3/2020 11:30 AM   Number of days: 0       Response to treatment:  Poorly tolerated by patient. Pain Assessment:  Severity:  Unable to determine  Quality of pain: moans with turning  Wound Pain Timing/Severity: intermittent  Premedicated: no    Plan:     Plan of Care: Wound 02/01/20 Leg Right;Medial-Dressing/Treatment: (Puracol, abd, kerlix)  Wound 02/01/20 Back Right;Posterior cluster-Dressing/Treatment: (Puracol, mepilex border)  Wound 02/03/20 Sacrum cluster with surrounding DTI-Dressing/Treatment: (calazime)  Wound 02/03/20 Ischium Right cluster with surrounding DTI-Dressing/Treatment: (calazime)      Pt in bed. OK to see per nurse. Son with pt. Pt opens eyes and moans when turned but not verbalize. Son stated right leg wound and right back wound initially from fall at [facility]. Had large hematoma initially and saw Dr. Radha Conteh in office on 1/28/20. Note reviewed. Referred to outpatient wound clinic but has not been seen yet or scheduled per son. Annabella Bergman might be arranging or treating themselves. Wounds noted as above. MEasurements and pictures taken. Treatment as above. Pt incontinent small amount of stool. Siomara care done and pad changed. Message sent to Dr. Wing Sargent recommending Santyl for right leg wound. Left heel red, intact, and blanching. Right heel WNL. Skin prep applied to both.  Repositioned to left side with

## 2020-02-03 NOTE — PROGRESS NOTES
Performed rectal stimulation on pt and was able to get a lot more stool out. Stool is now brown in color vs green and is now soft and formed vs hard.  At the end of the rectal stimulation the stool that I was pulling out was out more soft nonformed stool vs formed stool So I stopped and performed shakeel care, and put calamine, pink cream on coccyx

## 2020-02-04 NOTE — PROGRESS NOTES
elevated  Abdomen/GI:  Soft, non tender  Musculoskeletal:  Intact distal pulses, no edema to lower legs  Integument:  Warm, Dry- wound to right lower leg with dressing intact  Lymphatic:  No lymphadenopathy noted. Neurologic:  Alert - lethargic-   Psychiatric:  Affect and Mood :no verbal today - moans out loud     Medications:    vancomycin  1,000 mg Intravenous Q24H    diltiazem  120 mg Oral Daily    collagenase   Topical Daily    senna  1 tablet Oral BID    miconazole   Topical BID    cefepime  2 g Intravenous Q12H    apixaban  2.5 mg Oral BID    busPIRone  5 mg Oral BID    donepezil  10 mg Oral Nightly    insulin detemir  10 Units Subcutaneous Nightly    atorvastatin  10 mg Oral Daily    sodium chloride flush  10 mL Intravenous 2 times per day    docusate sodium  100 mg Oral Daily    ferrous sulfate  325 mg Oral Daily with breakfast    fluticasone  1 spray Each Nostril Daily    PARoxetine  20 mg Oral QAM    trospium  20 mg Oral BID AC    fluticasone  2 puff Inhalation BID      sodium chloride 100 mL/hr at 02/04/20 1031    dextrose       glucose, dextrose, glucagon (rDNA), dextrose, sodium chloride flush, magnesium hydroxide, ondansetron, acetaminophen, acetaminophen, albuterol sulfate HFA, [Held by provider] HYDROcodone-acetaminophen, nitroGLYCERIN    Lab Data:  CBC:   Recent Labs     02/02/20  0349 02/03/20  0805 02/04/20  0741   WBC 12.2* 10.1 8.8   HGB 9.4* 9.3* 8.8*   HCT 31.6* 31.3* 29.8*   MCV 97.8 98.7 101.4*    329 280     BMP:   Recent Labs     02/02/20  0349 02/03/20  0805 02/04/20  0741   * 137 140   K 3.9 4.0 3.8   CL 99 102 106   CO2 20* 21 19*   BUN 46* 46* 40*   CREATININE 1.2* 1.3* 1.1     PT/INR: No results for input(s): PROTIME, INR in the last 72 hours. BNP:  No results for input(s): PROBNP in the last 72 hours. TROPONIN: No results for input(s): TROPONINT in the last 72 hours.      Impression:  Active Problems:    Hypotension    MISA (acute kidney injury)

## 2020-02-04 NOTE — PLAN OF CARE
Problem: Falls - Risk of:  Goal: Will remain free from falls  Description  Will remain free from falls  Outcome: Ongoing  Goal: Absence of physical injury  Description  Absence of physical injury  Outcome: Ongoing     Problem: Risk for Impaired Skin Integrity  Goal: Tissue integrity - skin and mucous membranes  Description  Structural intactness and normal physiological function of skin and  mucous membranes.   Outcome: Ongoing     Problem: Cardiac:  Goal: Ability to maintain vital signs within normal range will improve  Description  Ability to maintain vital signs within normal range will improve  Outcome: Ongoing  Goal: Cardiovascular alteration will improve  Description  Cardiovascular alteration will improve  Outcome: Ongoing     Problem: Health Behavior:  Goal: Will modify at least one risk factor affecting health status  Description  Will modify at least one risk factor affecting health status  Outcome: Ongoing  Goal: Identification of resources available to assist in meeting health care needs will improve  Description  Identification of resources available to assist in meeting health care needs will improve  Outcome: Ongoing     Problem: Physical Regulation:  Goal: Complications related to the disease process, condition or treatment will be avoided or minimized  Description  Complications related to the disease process, condition or treatment will be avoided or minimized  Outcome: Ongoing     Problem: Nutrition  Goal: Optimal nutrition therapy  Outcome: Ongoing

## 2020-02-04 NOTE — CONSULTS
[address] [facility]  consulted by Dr. Brenda Nichols for monitoring and adjustment. Indication for treatment: Leg wound with MRSA, UTI with coliform  Goal trough: 15-20 mcg/mL  Other antimicrobials:  cefepime     Pertinent Laboratory Values:   Temp Readings from Last 3 Encounters:   02/04/20 98.2 °F (36.8 °C) (Oral)   01/26/20 98.3 °F (36.8 °C) (Oral)   12/08/19 98 °F (36.7 °C) (Oral)     Recent Labs     02/02/20  0349 02/02/20  1319 02/03/20  0805 02/04/20  0741   WBC 12.2*  --  10.1 8.8   LACTATE  --  0.9  --   --      Recent Labs     02/02/20  0349 02/03/20  0805 02/04/20  0741   BUN 46* 46* 40*   CREATININE 1.2* 1.3* 1.1     Estimated Creatinine Clearance: 45 mL/min (based on SCr of 1.1 mg/dL). Intake/Output Summary (Last 24 hours) at 2/4/2020 1632  Last data filed at 2/4/2020 1226  Gross per 24 hour   Intake 1718.33 ml   Output 1475 ml   Net 243.33 ml       Pertinent Cultures:  Date    Source    Results  1/29   Urine    Coliform  1/29   Blood    No growth  2/1                              Wound                                    MRSA    Vancomycin level:   TROUGH:  No results for input(s): VANCOTROUGH in the last 72 hours. RANDOM:  No results for input(s): VANCORANDOM in the last 72 hours. Assessment:  · WBC and temperature: WBC down to normal the past 2 days, pt afebrile  · SCr, BUN, and urine output: MISA, SCR back down to normal today  · Day(s) of therapy: 2  · Vancomycin level: scheduled for 2/6 @13:30    Plan:  · Dosing comments: Continue  vancomycin 1gm ivpb q24h  · Check vancomycin trough before the 4th dose  · Pharmacy will continue to monitor patient and adjust therapy as indicated    Sita 2/6 @13:30    Thank you for the consult. Ambar Ordonez Has  2/4/2020 4:32 PM

## 2020-02-04 NOTE — PROGRESS NOTES
Hospitalist Progress Note      Name:  Teja Gayle /Age/Sex: 1940  (79 y.o. female)   MRN & CSN:  [id number] & [id number] Admission Date/Time: 2020  8:03 PM   Location:  [address] [place] [address]- PCP: Saintclair Coop, MD         Hospital Day: 7    Assessment and Plan:   Teja Gayle is a 79 y.o.  female  who presents with AMS     1) Metabolic Encephalopathy 2/2 UTI due to chronic fan catheter  -UA positive for UTI  -MCS growing 4 diff bacteria: Morganella morganii, Pseudomonas aeruginosa, enterococcus and E coli- all sensitive to cefepime   -Continue IV Cefepime      2) Recent fall with RLE cellulitis  - CT head: Non acute; will repeat CT head due to worsening AMS to rule out bleed.  -Wound Cx growing MRSA  -Continue IV Vanc; Pharmacy dosing  -Wound care consulted; continue dressing     3) H/o Afib  - On cardizem; initially held due to bradycardia but currently resumed.  -Cardio on board; titrating dose  -Eliquis for [facility]     4) MISA possibly d/t pre-renal from dehydration  -Cr of 1.4--1.1  -Continue IVF NS; monitor for fluid overload  -Will trend      5) Chronic Constipation; might be opoid induced  -Improved  -CT abdomen: showed large volume of stool  -Ordered fleet enema  -Continue Senna  -Will hold off opoid for now       H/o dementia, depression  - aricept  - Buspar  - paxil    Diet Dietary Nutrition Supplements: Standard High Calorie Oral Supplement  DIET GENERAL; Dysphagia Pureed   DVT Prophylaxis [] Lovenox, []  Heparin, [] SCDs, [] Ambulation   GI Prophylaxis [] PPI,  [] H2 Blocker,  [] Carafate,  [] Diet/Tube Feeds   Code Status DNR-CC   Disposition SNF   MDM      History of Present Illness:     Patient was seen and examined  Looking drowsy and lethargic; not answering questions  Per son, this is new for her    Ten point ROS reviewed negative, unless as noted above    Objective:        Intake/Output Summary (Last 24 hours) at 2020 1351  Last data filed at 2020 0533  Gross per 24 hour

## 2020-02-05 NOTE — PROGRESS NOTES
Hospitalist Progress Note      Name:  Venkat Lindo /Age/Sex: 1940  (79 y.o. female)   MRN & CSN:  [id number] & [id number] Admission Date/Time: 2020  8:03 PM   Location:  [address] [place] [address] PCP: Juan R Dave MD         Hospital Day: 8    Assessment and Plan:   Venkat Lindo is a 79 y.o.  female  who presents with AMS     1) Metabolic Encephalopathy 2/2 UTI due to chronic fan catheter  -CT head: Non acute  -UA positive for UTI  -MCS growing 4 diff bacteria: Morganella morganii, Pseudomonas aeruginosa, enterococcus and E coli- all sensitive to cefepime   -Continue IV Cefepime      2) Recent fall with RLE cellulitis  - CT head: Non acute; will repeat CT head due to worsening AMS to rule out bleed.  -Wound Cx growing MRSA  -Continue IV Vanc; Pharmacy dosing  -Wound care consulted; continue dressing     3) H/o Afib  - On cardizem; initially held due to bradycardia but currently resumed.  -Cardio on board; titrating dose  -Eliquis for [facility]     4) MISA possibly d/t pre-renal from dehydration  -Cr of 1.4--1.1  -Continue IVF NS; monitor for fluid overload  -Will trend      5) Chronic Constipation; might be opoid induced  -Improved  -CT abdomen: showed large volume of stool  -Ordered fleet enema  -Continue Senna  -Will hold off opoid for now        H/o dementia, depression  - aricept  - Buspar  - paxil    Diet Dietary Nutrition Supplements: Frozen Oral Supplement  DIET GENERAL; Dysphagia Pureed   DVT Prophylaxis [] Lovenox, []  Heparin, [] SCDs, [] Ambulation   GI Prophylaxis [] PPI,  [] H2 Blocker,  [] Carafate,  [] Diet/Tube Feeds   Code Status DNR-CC   Disposition ECF   MDM      History of Present Illness:     Patient was seen and examined  More awake today and able to finish her lunch  No fever, chills  No N/V/D       Ten point ROS reviewed negative, unless as noted above    Objective:        Intake/Output Summary (Last 24 hours) at 2020 1532  Last data filed at 2020 1202  Gross per 24 hour Intake 130 ml   Output 900 ml   Net -770 ml      Vitals:   Vitals:    02/05/20 1411   BP: (!) 119/59   Pulse: 87   Resp: 19   Temp: 98.3 °F (36.8 °C)   SpO2:      Physical Exam:   GEN Awake female but still lethargic, laying in bed in no apparent distress. Appears given age. EYES Pupils are equally round. No scleral erythema, discharge, or conjunctivitis. HENT Mucous membranes are moist. Oral pharynx without exudates, no evidence of thrush. NECK Supple, no apparent thyromegaly or masses. RESP Clear to auscultation, no wheezes, rales or rhonchi. Symmetric chest movement while on room air. CARDIO/VASC S1/S2 auscultated. Regular rate without appreciable murmurs, rubs, or gallops. No JVD or carotid bruits. Peripheral pulses equal bilaterally and palpable. No peripheral edema. GI Abdomen is soft without significant tenderness, masses, or guarding. Bowel sounds are normoactive. Rectal exam deferred.  No costovertebral angle tenderness. Normal appearing external genitalia. Pappas catheter is present. HEME/LYMPH No palpable cervical lymphadenopathy and no hepatosplenomegaly. No petechiae or ecchymoses. MSK No gross joint deformities. SKIN Normal coloration, warm, dry. NEURO Cranial nerves appear grossly intact, normal speech, no lateralizing weakness. PSYCH Awake, alert but lethargic. Affect appropriate.     Medications:   Medications:    vancomycin  1,000 mg Intravenous Q24H    diltiazem  120 mg Oral Daily    collagenase   Topical Daily    senna  1 tablet Oral BID    miconazole   Topical BID    cefepime  2 g Intravenous Q12H    apixaban  2.5 mg Oral BID    busPIRone  5 mg Oral BID    donepezil  10 mg Oral Nightly    insulin detemir  10 Units Subcutaneous Nightly    atorvastatin  10 mg Oral Daily    sodium chloride flush  10 mL Intravenous 2 times per day    docusate sodium  100 mg Oral Daily    ferrous sulfate  325 mg Oral Daily with breakfast    fluticasone  1 spray Each Nostril Daily    PARoxetine  20 mg Oral QAM    trospium  20 mg Oral BID AC    fluticasone  2 puff Inhalation BID      Infusions:    sodium chloride 75 mL/hr at 02/05/20 1429    dextrose       PRN Meds: glucose, 15 g, PRN  dextrose, 12.5 g, PRN  glucagon (rDNA), 1 mg, PRN  dextrose, 100 mL/hr, PRN  sodium chloride flush, 10 mL, PRN  magnesium hydroxide, 30 mL, Daily PRN  ondansetron, 4 mg, Q6H PRN  acetaminophen, 650 mg, Q4H PRN  acetaminophen, 650 mg, Q8H PRN  albuterol sulfate HFA, 2 puff, Q6H PRN  [Held by provider] HYDROcodone-acetaminophen, 1 tablet, Q8H PRN  nitroGLYCERIN, 0.4 mg, Q5 Min PRN          Electronically signed by Curtis Burt MD on 2/5/2020 at 3:32 PM

## 2020-02-05 NOTE — PROGRESS NOTES
Speech Language Pathology  Facility/Department: [facility] 3N   CLINICAL BEDSIDE SWALLOW EVALUATION    NAME: Joshua Zavala  : 1940  MRN: [id number]    IMPRESSIONS: Joshua Zavala was referred for a bedside swallow evaluation after being admitted to [facility] from nursing home with hypotension. Medical hx includes a-fib, CAD, COPD, DM, GERD, HLD, HTN. No prior known history of dysphagia. Pt seen for evaluation seated upright in bed, alert, fairly cooperative given cues. Oral mechanism examination reveals ?tardive dyskinesia with tremors of mandible and tongue, as well as lip \"smacking\". This was noted at rest, in motion, and in sustained positions. No asymmetry noted. She was presented with PO trials of ice chips, thin liquids via tsp/cup/straw, puree, and regular solids. Oral stage moderately impaired, with reduced lingual coordination for bolus formation, reduced/incomplete mastication for solid 2/2 missing dentition, lingual pumping for AP transit, adequate eventual oral clearance for all textures. She was unable to achieve labial seal for adequate suction through straw. Suspect mild pharyngeal dysphagia with delayed swallow initiation, reduced laryngeal elevation. No s/s aspiration. Recommend pureed diet with thin liquids and feeding assistance. Give pills whole in puree or pudding texture. SLP will follow to monitor briefly. ADMISSION DATE: 2020  ADMITTING DIAGNOSIS: has Lymphedema of lower extremity; CAD (coronary artery disease); Hypertension; COPD (chronic obstructive pulmonary disease); Irritable bowel syndrome; Hyperlipidemia; Depression; Obstructive sleep apnea on CPAP; H/O cardiac catheterization; Asthma; Incontinence of urine; Osteoarthritis; Carotid artery stenosis; Diabetic mononeuropathy associated with type 2 diabetes mellitus ([address] Diabetic nephropathy associated with type 2 diabetes mellitus ([address]  Syncope and collapse; Chronic atrial fibrillation; SIRS due to Recommendation: Thin  Recommended Form of Meds: Meds in puree  Recommendations: Assist feed  Therapeutic Interventions: Diet tolerance monitoring;Patient/Family education; Therapeutic PO trials with SLP    Compensatory Swallowing Strategies  Compensatory Swallowing Strategies: Upright as possible for all oral intake;Assist feed;Eat/Feed slowly    Treatment/Goals  Short-term Goals  Timeframe for Short-term Goals: length of admission  Goal 1: Pt will tolerate pureed diet/thin liquids with adequate oral manipulation and no s/s aspiration. Goal 2: Pt/caregivers will indicate understanding of all recommendations. General  Chart Reviewed: Yes  Behavior/Cognition: Alert; Requires cueing  Communication Observation: Non-verbal  Follows Directions: Simple(with cues, inconsistently)  Dentition: Edentulous  Patient Positioning: Upright in bed  Prior Dysphagia History: none known prior to admission  Consistencies Administered: Dysphagia Pureed (Dysphagia I); Reg solid; Thin - teaspoon; Thin - cup; Thin - straw; Ice Chips           Vision/Hearing       Oral Motor Deficits  Oral/Motor  Oral Motor: Exceptions to [facility]    Oral Phase Dysfunction  Oral Phase  Oral Phase: Exceptions     Indicators of Pharyngeal Phase Dysfunction   Pharyngeal Phase  Pharyngeal Phase: Exceptions    Prognosis  Prognosis  Prognosis for safe diet advancement: guarded  Individuals consulted  Consulted and agree with results and recommendations: Patient;RN    Education  Patient Education: recommendations/plan  Patient Education Response: Needs reinforcement  Safety Devices in place: Yes  Type of devices:  All fall risk precautions in place       Therapy Time  SLP Individual Minutes  Time In: 1300  Time Out: [address]  Minutes: [place], [address] [place]-SLP  2/5/2020 1:56 PM

## 2020-02-05 NOTE — PLAN OF CARE
Problem: Falls - Risk of:  Goal: Will remain free from falls  Description  Will remain free from falls  2/5/2020 1207 by Corrina Vuong RN  Outcome: Ongoing  2/5/2020 0120 by Lonny Jeans, RN  Outcome: Ongoing  Goal: Absence of physical injury  Description  Absence of physical injury  2/5/2020 1207 by Corrina Vuong RN  Outcome: Ongoing  2/5/2020 0120 by Lonny Jeans, RN  Outcome: Ongoing     Problem: Risk for Impaired Skin Integrity  Goal: Tissue integrity - skin and mucous membranes  Description  Structural intactness and normal physiological function of skin and  mucous membranes.   2/5/2020 1207 by Corrina Vuong RN  Outcome: Ongoing  2/5/2020 0120 by Lonny Jeans, RN  Outcome: Ongoing     Problem: Cardiac:  Goal: Ability to maintain vital signs within normal range will improve  Description  Ability to maintain vital signs within normal range will improve  2/5/2020 1207 by Corrina Vuong RN  Outcome: Ongoing  2/5/2020 0120 by Lonny Jeans, RN  Outcome: Ongoing  Goal: Cardiovascular alteration will improve  Description  Cardiovascular alteration will improve  2/5/2020 1207 by Corrina Vuong RN  Outcome: Ongoing  2/5/2020 0120 by Lonny Jeans, RN  Outcome: Ongoing     Problem: Health Behavior:  Goal: Will modify at least one risk factor affecting health status  Description  Will modify at least one risk factor affecting health status  2/5/2020 1207 by Corrina Vuong RN  Outcome: Ongoing  2/5/2020 0120 by Lonny Jeans, RN  Outcome: Ongoing  Goal: Identification of resources available to assist in meeting health care needs will improve  Description  Identification of resources available to assist in meeting health care needs will improve  2/5/2020 1207 by Corrina Vuong RN  Outcome: Ongoing  2/5/2020 0120 by Lonny Jeans, RN  Outcome: Ongoing     Problem: Physical Regulation:  Goal: Complications related to the disease process, condition or treatment will be avoided or minimized  Description  Complications related to the disease process, condition or treatment will be avoided or minimized  2/5/2020 1207 by Dulce Amaya RN  Outcome: Ongoing  2/5/2020 0120 by Tico Ricks RN  Outcome: Ongoing     Problem: Nutrition  Goal: Optimal nutrition therapy  2/5/2020 1207 by Dulce mAaya RN  Outcome: Ongoing  2/5/2020 0120 by Tico Ricks RN  Outcome: Ongoing

## 2020-02-05 NOTE — PROGRESS NOTES
Palliative Care RN visit. Patient is more awake and alert today. Ate for the staff today. When I entered the room she smiled, and mouthed Hi. When I went up to the side of the bed, and said hello, she said hi back. Her son is at the bedside and was so happy to hear to speak to me. He was on the phone with his sister, and he was able to let her know. She did not seem to recognize him though. He even tried to sing to her, and she did not respond to that.  Support given

## 2020-02-05 NOTE — PROGRESS NOTES
[facility]  consulted by Dr. Maricruz Laughlin for monitoring and adjustment. Indication for treatment: RLE cellulitis  Goal trough: 15-20 mcg/mL  Other antimicrobials:  cefepime     Pertinent Laboratory Values:   Temp Readings from Last 3 Encounters:   02/05/20 98.4 °F (36.9 °C) (Oral)   01/26/20 98.3 °F (36.8 °C) (Oral)   12/08/19 98 °F (36.7 °C) (Oral)     Recent Labs     02/02/20  1319 02/03/20  0805 02/04/20  0741 02/05/20  0307   WBC  --  10.1 8.8 8.6   LACTATE 0.9  --   --   --      Recent Labs     02/03/20  0805 02/04/20  0741 02/05/20  0307   BUN 46* 40* 36*   CREATININE 1.3* 1.1 1.0     Estimated Creatinine Clearance: 49 mL/min (based on SCr of 1 mg/dL). Intake/Output Summary (Last 24 hours) at 2/5/2020 1132  Last data filed at 2/5/2020 9552  Gross per 24 hour   Intake 200 ml   Output 900 ml   Net -700 ml       Pertinent Cultures:  Date    Source    Results  1/29   Blood    NGTD  2/1                               Wound                                    MRSA    Vancomycin level:   TROUGH:  No results for input(s): VANCOTROUGH in the last 72 hours. RANDOM:  No results for input(s): VANCORANDOM in the last 72 hours. Assessment:  · WBC and temperature: WNL  · SCr, BUN, and urine output: stable  · Day(s) of therapy: 3  · Vancomycin level: scheduled for 2/6 @13:30    Plan:  · Vancomycin 1gm q24h  · Check vancomycin trough before the 4th dose  · Pharmacy will continue to monitor patient and adjust therapy as indicated    Sita 2/6 @13:30    Thank you for the consult.   Rico Maurice, [address]  2/5/2020 11:32 AM

## 2020-02-06 NOTE — PROGRESS NOTES
Hospitalist Progress Note      Name:  Criss Cason /Age/Sex: 1940  (79 y.o. female)   MRN & CSN:  [id number] & [id number] Admission Date/Time: 2020  8:03 PM   Location:  [address] [place] [address]-A PCP: Jin Estrella, Asset Mapping Day: 9    Assessment and Plan:   Gautam Valentin a 79 y.o.  female  who presents with AMS     1) Metabolic Encephalopathy 2/2 UTI due to chronic fan catheter  -CT head: Non acute  -UA positive for UTI  -MCS growing 4 diff bacteria: Morganella morganii, Pseudomonas aeruginosa, enterococcus and E coli- all sensitive to cefepime   -Completed 7 days IV Cefepime      2) Recent fall with RLE cellulitis  - CT head: Non acute; will repeat CT head due to worsening AMS to rule out bleed.  -Wound Cx growing MRSA  -Continue IV Vanc; Pharmacy dosing. Can dc Vanc tomorrow after completing 5 days  -Wound care consulted; continue dressing     3) H/o Afib  - On cardizem; initially held due to bradycardia but currently resumed.  -Cardio on board; titrating dose  -Eliquis for AC     4) MISA possibly d/t pre-renal from dehydration  -Cr of 1.4--1.1  -Continue IVF NS; monitor for fluid overload  -Will trend      5) Chronic Constipation; might be opoid induced  -Improved  -CT abdomen: showed large volume of stool  -Ordered fleet enema  -Continue Senna  -Will hold off opoid for now        H/o dementia, depression  - aricept  - Buspar  - paxil       Plan is to be discharged to [facility] with hospice possibly tomorrow if patient continues to improve. Feeding has been an issue but seems to be improving since infection treatment.    Diet Dietary Nutrition Supplements: Frozen Oral Supplement  DIET GENERAL; Dysphagia Pureed   DVT Prophylaxis [] Lovenox, []  Heparin, [] SCDs, [] Ambulation   GI Prophylaxis [] PPI,  [] H2 Blocker,  [] Carafate,  [] Diet/Tube Feeds   Code Status DNR-CC   Disposition ECF with hospice   MDM      History of Present Illness:     Patient was seen and examined  Looks more awake and

## 2020-02-06 NOTE — PROGRESS NOTES
[address] [place] OF SPEECH/LANGUAGE PATHOLOGY  DAILY PROGRESS NOTE  Nadia Mendieta  2/6/2020  [id number]  Confusion [R41.0]  Hypotension [I95.9]  MISA (acute kidney injury) ([facility] [address] [address] [N17.9]  Hypotension, unspecified hypotension type [I95.9]  Allergies   Allergen Reactions    Monopril [Fosinopril Sodium] Other (See Comments)    Norvasc [Amlodipine Besylate] Other (See Comments)     unknown    Phenobarbital     Ziac [Bisoprolol-Hydrochlorothiazide] Other (See Comments)    Bactrim [Sulfamethoxazole-Trimethoprim] Itching    Chocolate Rash     Dark Chocolate    Penicillins Rash         Pt was seen this date for dysphagia treatment. IMPRESSION AND RECOMMENDATIONS: Nadia Mendieta was seen for dysphagia follow-up seated upright in bed, alert, fairly cooperative given cues. RN reports no concerns with diet tolerance today. Pt produced limited verbalizations today (improved from no verbal communication yesterday). She accepted limited PO trials of thin liquids via tsp and straw. Oral stage with holding/delayed AP transit and adequate clearance. Pharyngeal stage adequate with slow swallow initiation, reduced vs age-appropriate laryngeal elevation. No s/s aspiration. Recommend continued current diet. No further SLP needs indicated as pt appears at baseline function and is tolerating baseline diet without concerns. GOALS (current status in bold):  Short-term Goals  Timeframe for Short-term Goals: length of admission  Goal 1: Pt will tolerate pureed diet/thin liquids with adequate oral manipulation and no s/s aspiration. Meeting  Goal 2: Pt/caregivers will indicate understanding of all recommendations.  Meeting        EDUCATION: reviewed recommendations/no further tx    PAIN RATING (0-10 Scale): appears comfortable, does not state/rate  Time in/Time out: SLP Individual Minutes  Time In: 1355  Time Out: [facility]  Minutes: 10    Visit number: [id number] [facility] CCC-SLP  2/6/2020  2:10 PM

## 2020-02-06 NOTE — PROGRESS NOTES
Physical Therapy  Patient plans to return to LTC with hospice services. Will d/c PT order, please re-order if medical plan changes.

## 2020-02-06 NOTE — PLAN OF CARE
Problem: Falls - Risk of:  Goal: Will remain free from falls  Description  Will remain free from falls  2/6/2020 1117 by Torsten Santiago  Outcome: Ongoing  2/6/2020 0858 by Albert Rosa RN  Outcome: Ongoing  Goal: Absence of physical injury  Description  Absence of physical injury  2/6/2020 1117 by Torsten Santiago  Outcome: Ongoing  2/6/2020 0858 by Albert Rosa RN  Outcome: Ongoing     Problem: Risk for Impaired Skin Integrity  Goal: Tissue integrity - skin and mucous membranes  Description  Structural intactness and normal physiological function of skin and  mucous membranes.   2/6/2020 1117 by Torsten Santiago  Outcome: Ongoing  2/6/2020 0858 by Albert Rosa RN  Outcome: Not Met This Shift     Problem: Cardiac:  Goal: Ability to maintain vital signs within normal range will improve  Description  Ability to maintain vital signs within normal range will improve  2/6/2020 1117 by Torsten Santiago  Outcome: Ongoing  2/6/2020 0858 by Albert Rosa RN  Outcome: Ongoing  Goal: Cardiovascular alteration will improve  Description  Cardiovascular alteration will improve  2/6/2020 1117 by Torsten Santiago  Outcome: Ongoing  2/6/2020 0858 by Albert Rosa RN  Outcome: Ongoing     Problem: Health Behavior:  Goal: Will modify at least one risk factor affecting health status  Description  Will modify at least one risk factor affecting health status  2/6/2020 1117 by Torsten Santiago  Outcome: Ongoing  2/6/2020 0858 by Albert Rosa RN  Outcome: Ongoing  Goal: Identification of resources available to assist in meeting health care needs will improve  Description  Identification of resources available to assist in meeting health care needs will improve  2/6/2020 1117 by Torsten Santiago  Outcome: Ongoing  2/6/2020 0858 by Albert Rosa RN  Outcome: Ongoing     Problem: Physical Regulation:  Goal: Complications related to the disease process, condition or treatment will be

## 2020-02-06 NOTE — PROGRESS NOTES
Di Castañeda BSN RN CCRN-K clinical  for [facility] [place] by [facility] SURGICAL [facility] RN students 07-19:00 this date.

## 2020-02-06 NOTE — PLAN OF CARE
Problem: Falls - Risk of:  Goal: Will remain free from falls  Description  Will remain free from falls  2/6/2020 1247 by Ariel Osuna RN  Outcome: Ongoing  2/6/2020 1117 by Tho Campbell  Outcome: Ongoing  2/6/2020 0858 by Preeti Acosta RN  Outcome: Ongoing  Goal: Absence of physical injury  Description  Absence of physical injury  2/6/2020 1247 by Ariel Osuna RN  Outcome: Ongoing  2/6/2020 1117 by Tho Campbell  Outcome: Ongoing  2/6/2020 0858 by Preeti Acosta RN  Outcome: Ongoing     Problem: Risk for Impaired Skin Integrity  Goal: Tissue integrity - skin and mucous membranes  Description  Structural intactness and normal physiological function of skin and  mucous membranes.   2/6/2020 1247 by Ariel Osuna RN  Outcome: Ongoing  2/6/2020 1117 by Tho Campbell  Outcome: Ongoing  2/6/2020 0858 by Preeti Acosta RN  Outcome: Not Met This Shift     Problem: Cardiac:  Goal: Ability to maintain vital signs within normal range will improve  Description  Ability to maintain vital signs within normal range will improve  2/6/2020 1247 by Ariel Osuna RN  Outcome: Ongoing  2/6/2020 1117 by Tho Campbell  Outcome: Ongoing  2/6/2020 0858 by Preeti Acosta RN  Outcome: Ongoing  Goal: Cardiovascular alteration will improve  Description  Cardiovascular alteration will improve  2/6/2020 1247 by Ariel Osuna RN  Outcome: Ongoing  2/6/2020 1117 by Tho Campbell  Outcome: Ongoing  2/6/2020 0858 by Preeti Acosta RN  Outcome: Ongoing     Problem: Health Behavior:  Goal: Will modify at least one risk factor affecting health status  Description  Will modify at least one risk factor affecting health status  2/6/2020 1247 by Ariel Osuna RN  Outcome: Ongoing  2/6/2020 1117 by Tho Campbell  Outcome: Ongoing  2/6/2020 0858 by Preeti Acosta RN  Outcome: Ongoing  Goal: Identification of resources available to assist in meeting health care needs will improve  Description  Identification of resources available to assist in meeting health care needs will improve  2/6/2020 1247 by Ariel Osuna RN  Outcome: Ongoing  2/6/2020 1117 by Tho Campbell  Outcome: Ongoing  2/6/2020 0858 by Preeti Acosta RN  Outcome: Ongoing     Problem: Physical Regulation:  Goal: Complications related to the disease process, condition or treatment will be avoided or minimized  Description  Complications related to the disease process, condition or treatment will be avoided or minimized  2/6/2020 1247 by Ariel Osuna RN  Outcome: Ongoing  2/6/2020 1117 by Tho Campbell  Outcome: Ongoing  2/6/2020 0858 by Preeti Acsota RN  Outcome: Ongoing     Problem: Nutrition  Goal: Optimal nutrition therapy  2/6/2020 1247 by Ariel Osuna RN  Outcome: Ongoing  2/6/2020 1117 by Tho Campbell  Outcome: Ongoing  2/6/2020 0858 by Preeti Acosta RN  Outcome: Ongoing

## 2020-02-07 NOTE — PROGRESS NOTES
· Monitoring: Meal Intake, Supplement Intake, Diet Tolerance, Wound Healing, Mental Status/Confusion, Weight, Pertinent Labs, Chewing/Swallowing, Nausea or Vomiting, Constipation      Electronically signed by Teresa Velazquez RD, KRISTINE on 2/7/20 at 10:41 AM    Contact Number: [id number]

## 2020-02-07 NOTE — PROGRESS NOTES
Hospitalist Progress Note      Name:  Joshua Zavala /Age/Sex: 1940  (79 y.o. 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  She is still nonverbal.  She is awake and able to follow commands. She is nodding her head to say yes and no. Denies having any chest pain or shortness of breath. Ten point ROS reviewed negative, unless as noted above    Objective:        Intake/Output Summary (Last 24 hours) at 2020 1650  Last data filed at 2020 0252  Gross per 24 hour   Intake 941.13 ml   Output 600 ml   Net 341.13 ml      Vitals:   Vitals:    20 1534   BP: (!) 144/132   Pulse:    Resp:    Temp: 97.8 °F (36.6 °C)   SpO2:      Physical Exam:   General Appearance: alert but nonverbal, in no acute distress  Cardiovascular: normal rate, regular rhythm, normal S1 an but nonverbal d S2, no murmurs, rubs, clicks, or gallops, distal pulses intact, no carotid bruits, no JVD  Pulmonary/Chest: clear to auscultation bilaterally- no wheezes, rales or rhonchi, normal air movement, no respiratory distress  Abdomen: soft, non-tender, non-distended, normal bowel sounds, no masses   Extremities: no cyanosis, clubbing or edema, pulse   Skin: warm and dry, no rash or erythema  Head: normocephalic and atraumatic  Eyes: pupils equal, round, and reactive to light  Neck: supple and non-tender without mass, no thyromegaly   Musculoskeletal: normal range of motion, no joint swelling, deformity or tenderness  Neurological: alert, nonverbal, no focal findings or movement disorder noted    Medications:   Medications:    vancomycin  1,000 mg Intravenous Q24H    diltiazem  120 mg Oral Daily    collagenase   Topical Daily    senna  1 tablet Oral BID    miconazole   Topical BID    apixaban  2.5 mg Oral BID    busPIRone  5 mg Oral BID    donepezil  10 mg Oral Nightly    insulin detemir  10 Units Subcutaneous Nightly    atorvastatin  10 mg Oral Daily    sodium chloride flush  10 mL Intravenous 2 times per day    docusate sodium  100 mg Oral Daily    ferrous sulfate  325 mg Oral Daily with breakfast    fluticasone  1 spray Each Nostril Daily    PARoxetine  20 mg Oral QAM    trospium  20 mg Oral BID AC    fluticasone  2 puff Inhalation BID      Infusions:    sodium chloride 75 mL/hr at 02/07/20 1548    dextrose       PRN Meds: glucose, 15 g, PRN  dextrose, 12.5 g, PRN  glucagon (rDNA), 1 mg, PRN  dextrose, 100 mL/hr, PRN  sodium chloride flush, 10 mL, PRN  magnesium hydroxide, 30 mL, Daily PRN  ondansetron, 4 mg, Q6H PRN  acetaminophen, 650 mg, Q4H PRN  acetaminophen, 650 mg, Q8H PRN  albuterol sulfate HFA, 2 puff, Q6H PRN  [Held by provider] HYDROcodone-acetaminophen, 1 tablet, Q8H PRN  nitroGLYCERIN, 0.4 mg, Q5 Min PRN            Pertinent New Labs & Imaging Studies     CBC:   Lab Results   Component Value Date    WBC 13.9 02/07/2020    RBC 3.26 02/07/2020    HGB 9.8 02/07/2020    HCT 34.0 02/07/2020    .3 02/07/2020    MCH 30.1 02/07/2020    MCHC 28.8 02/07/2020    RDW 15.3 02/07/2020     02/07/2020    MPV 10.0 02/07/2020     BMP:    Lab Results   Component Value Date     02/07/2020    K 3.9 02/07/2020     02/07/2020    CO2 18 02/07/2020    BUN 23 02/07/2020    LABALBU 3.3 01/31/2020    CREATININE 0.8 02/07/2020    CALCIUM 7.7 02/07/2020    GFRAA >60 02/07/2020    GFRAA >60 01/04/2013    LABGLOM >60 02/07/2020    LABGLOM 56 11/08/2013    GLUCOSE 101 02/07/2020     Ct Head Wo Contrast    Result Date: 1/29/2020  EXAMINATION: CT OF THE HEAD WITHOUT CONTRAST  1/29/2020 9:29 pm TECHNIQUE: CT of the head was performed without the administration of intravenous contrast. Dose modulation, iterative reconstruction, and/or weight based adjustment of the mA/kV was utilized to reduce the radiation dose to as low as reasonably achievable. COMPARISON: 01/26/2020. HISTORY: ORDERING SYSTEM PROVIDED HISTORY: lethargy TECHNOLOGIST PROVIDED HISTORY: Reason for exam:->lethargy Has a \"code stroke\" or \"stroke alert\" been called? ->No Reason for Exam: lethargy Acuity: Unknown Type of Exam: Unknown Additional signs and symptoms: Pt is a bedbound pt from [facility]. . Wound on right leg from December. Pt fell out of bed on Saturday and was seen and cleared . Pt nurse reports low BP and O2. Pt does not wear O2 . Arrived on non rebreather. After triage pt O2 is 100 on room air Relevant Medical/Surgical History: pt alert FINDINGS: BRAIN/VENTRICLES: There is no acute intracranial hemorrhage, mass effect or midline shift. No abnormal extra-axial fluid collection. The gray-white differentiation is maintained without evidence of an acute infarct. There is no evidence of hydrocephalus. Stable generalized prominence of the ventricular and cisternal spaces. Decreased attenuation in the periventricular and subcortical white matter consistent with small vessel ischemic change. Intracranial atherosclerotic vascular calcification. ORBITS: The visualized portion of the orbits demonstrate no acute abnormality. SINUSES: The visualized paranasal sinuses and mastoid air cells demonstrate no acute abnormality. SOFT TISSUES/SKULL:  Interval decrease in size of the scalp hematoma in the right posterior parietal region. No acute calvarial abnormality. No acute intracranial abnormality. Stable mild generalized cerebral atrophy and chronic small vessel white matter ischemic changes. Xr Chest Portable    Result Date: 1/29/2020  EXAMINATION: ONE XRAY VIEW OF THE CHEST 1/29/2020 8:09 pm COMPARISON: Chest radiograph 03/05/2017.  HISTORY: ORDERING SYSTEM PROVIDED HISTORY: hypoxia TECHNOLOGIST PROVIDED HISTORY: Reason for exam:->hypoxia Reason for Exam: hypoxia Acuity: tolerate. He said he understood. I have also explained that how the management might or might not be affected by the fact based on MRI results. Patient's son said he understands all of these and would like to proceed with MRI of brain.   Diet Dietary Nutrition Supplements: Frozen Oral Supplement  DIET GENERAL; Dysphagia Pureed   DVT Prophylaxis [] Lovenox, []  Heparin, [] SCDs, [x] on apixaban   GI Prophylaxis [] PPI,  [] H2 Blocker,  [] Carafate,  [x] Diet/Tube Feeds   Code Status DNR-CC   Disposition Patient requires continued admission due to altered mental status   MDM [] Low, [x] Moderate,[]  High     Electronically signed by Laura Encinas MD on 2/7/2020 at 4:50 PM

## 2020-02-08 NOTE — PLAN OF CARE
Problem: Falls - Risk of:  Goal: Will remain free from falls  Description  Will remain free from falls  2/7/2020 2251 by Cornelia Santiago RN  Outcome: Ongoing  2/7/2020 1324 by Harlan Stinson RN  Outcome: Ongoing  Goal: Absence of physical injury  Description  Absence of physical injury  2/7/2020 2251 by Cornelia Santiago RN  Outcome: Ongoing  2/7/2020 1324 by Harlan Stinson RN  Outcome: Ongoing     Problem: Risk for Impaired Skin Integrity  Goal: Tissue integrity - skin and mucous membranes  Description  Structural intactness and normal physiological function of skin and  mucous membranes.   2/7/2020 2251 by Cornelia Santiago RN  Outcome: Ongoing  2/7/2020 1324 by Harlan Stinson RN  Outcome: Ongoing     Problem: Cardiac:  Goal: Ability to maintain vital signs within normal range will improve  Description  Ability to maintain vital signs within normal range will improve  2/7/2020 2251 by Cornelia Santiago RN  Outcome: Ongoing  2/7/2020 1324 by Harlan Stinson RN  Outcome: Ongoing  Goal: Cardiovascular alteration will improve  Description  Cardiovascular alteration will improve  2/7/2020 2251 by Cornelia Santiago RN  Outcome: Ongoing  2/7/2020 1324 by Harlan Stinson RN  Outcome: Ongoing     Problem: Health Behavior:  Goal: Will modify at least one risk factor affecting health status  Description  Will modify at least one risk factor affecting health status  2/7/2020 2251 by Cornelia Santiago RN  Outcome: Ongoing  2/7/2020 1324 by Harlan Stinson RN  Outcome: Ongoing  Goal: Identification of resources available to assist in meeting health care needs will improve  Description  Identification of resources available to assist in meeting health care needs will improve  2/7/2020 2251 by Cornelia Santiago RN  Outcome: Ongoing  2/7/2020 1324 by Harlan Stinson RN  Outcome: Ongoing     Problem: Physical Regulation:  Goal: Complications related to the disease process, condition or treatment will be avoided or

## 2020-02-08 NOTE — PROGRESS NOTES
chloride 75 mL/hr at 02/08/20 0535    dextrose       PRN Meds: fluticasone, 2 puff, BID PRN  glucose, 15 g, PRN  dextrose, 12.5 g, PRN  glucagon (rDNA), 1 mg, PRN  dextrose, 100 mL/hr, PRN  sodium chloride flush, 10 mL, PRN  magnesium hydroxide, 30 mL, Daily PRN  ondansetron, 4 mg, Q6H PRN  acetaminophen, 650 mg, Q4H PRN  acetaminophen, 650 mg, Q8H PRN  albuterol sulfate HFA, 2 puff, Q6H PRN  [Held by provider] HYDROcodone-acetaminophen, 1 tablet, Q8H PRN  nitroGLYCERIN, 0.4 mg, Q5 Min PRN          Electronically signed by Karen Alvarado MD on 2/8/2020 at 2:44 PM

## 2020-02-08 NOTE — PLAN OF CARE
Problem: Falls - Risk of:  Goal: Will remain free from falls  Description  Will remain free from falls  2/8/2020 1150 by Arthur Calhoun RN  Outcome: Ongoing  2/7/2020 2251 by Galindo Shah RN  Outcome: Ongoing  Goal: Absence of physical injury  Description  Absence of physical injury  2/8/2020 1150 by Arthur Calhoun RN  Outcome: Ongoing  2/7/2020 2251 by Galindo Shah RN  Outcome: Ongoing     Problem: Risk for Impaired Skin Integrity  Goal: Tissue integrity - skin and mucous membranes  Description  Structural intactness and normal physiological function of skin and  mucous membranes.   2/8/2020 1150 by Arthur Calhoun RN  Outcome: Ongoing  2/7/2020 2251 by Galindo Shah RN  Outcome: Ongoing     Problem: Cardiac:  Goal: Ability to maintain vital signs within normal range will improve  Description  Ability to maintain vital signs within normal range will improve  2/8/2020 1150 by Arthur Calhoun RN  Outcome: Ongoing  2/7/2020 2251 by Galindo Shah RN  Outcome: Ongoing  Goal: Cardiovascular alteration will improve  Description  Cardiovascular alteration will improve  2/8/2020 1150 by Arthur Calhoun RN  Outcome: Ongoing  2/7/2020 2251 by Galindo Shah RN  Outcome: Ongoing     Problem: Health Behavior:  Goal: Will modify at least one risk factor affecting health status  Description  Will modify at least one risk factor affecting health status  2/8/2020 1150 by Arthur Calhoun RN  Outcome: Ongoing  2/7/2020 2251 by Galindo Shah RN  Outcome: Ongoing  Goal: Identification of resources available to assist in meeting health care needs will improve  Description  Identification of resources available to assist in meeting health care needs will improve  2/8/2020 1150 by Arthur Calhoun RN  Outcome: Ongoing  2/7/2020 2251 by Galindo Shah RN  Outcome: Ongoing     Problem: Physical Regulation:  Goal: Complications related to the disease process, condition or treatment will be avoided or minimized  Description  Complications related to the disease process, condition or treatment will be avoided or minimized  2/8/2020 1150 by Carlos Alberto Walsh RN  Outcome: Ongoing  2/7/2020 2251 by Noa Sgeovia RN  Outcome: Ongoing     Problem: Nutrition  Goal: Optimal nutrition therapy  2/8/2020 1150 by Carlos Alberto Walsh RN  Outcome: Ongoing  2/7/2020 2251 by Noa Segoiva RN  Outcome: Ongoing

## 2020-02-09 NOTE — DISCHARGE SUMMARY
Discharge Summary    Name:  Parker Hernandez /Age/Sex: 1940  (79 y.o. female)   MRN & CSN:  [id number] & [id number] Admission Date/Time: 2020  8:03 PM   Attending:  Mike Hamilton MD Discharging Physician: Nigel Davis MD     Hospital Course:   Yamel Duffy a 79 y.o.  female  who presents with AMS    Patient was seen and examined  Looking much better this morning  Denied any chest pain, dizziness  No palpitations, no fever, chills  No N/V/D     Assessment and Plan  1) Acute Metabolic Encephalopathy 2/2 UTI due to chronic fan catheter  -CT and MRI head: Non acute  -UA positive for UTI  -MCS growing 4 diff bacteria: Morganella morganii, Pseudomonas aeruginosa, enterococcus and E coli- all sensitive to cefepime   -Completed 7 days IV Cefepime   -Leucocytosis likely due to dehydration; encouraged adequate intake. -Back to her baseline mentation     2) Recent fall with RLE cellulitis  - CT head: Non acute  -Wound Cx growing MRSA  -Completed 6 days of IV Vanc   -Wound care consulted; continue dressing     3) H/o Afib  - On cardizem; initially held due to bradycardia but currently resumed.  -Cardio on board; titrating dose  -Eliquis for AC     4) MISA possibly d/t pre-renal from dehydration  -Cr of 1.4--1.1  -Continue IVF NS; monitor for fluid overload  -Will trend      5) Chronic Constipation; might be opoid induced  -Improved  -CT abdomen: showed large volume of stool  -Ordered fleet enema  -Continue Senna  -Will hold off opoid for now        H/o dementia, depression  - aricept  - Buspar  - paxil        The patient expressed appropriate understanding of and agreement with the discharge recommendations, medications, and plan.      Consults this admission:  IP CONSULT TO HOSPITALIST  IP CONSULT TO PHARMACY  IP CONSULT TO CARDIOLOGY  IP CONSULT TO PALLIATIVE CARE  IP CONSULT TO HOSPICE  IP CONSULT TO CASE MANAGEMENT  IP CONSULT TO PHARMACY    Discharge Instruction:   Follow up Supplies (REYES CATHETER CARE) KIT  Use as directed by doctor. 24 inc with a 30 balloon. Incontinence Supplies MISC  by Does not apply route Indications: (URINARY LEG BAG)             insulin detemir (LEVEMIR) 100 UNIT/ML injection vial  Inject 10 Units into the skin nightly             loperamide (IMODIUM) 2 MG capsule  Take 2 mg by mouth 4 times daily as needed for Diarrhea Indications: Loose Stools             meclizine (ANTIVERT) 12.5 MG tablet  Take 12.5 mg by mouth 3 times daily as needed             methocarbamol (ROBAXIN) 500 MG tablet  Take 1 tablet by mouth 2 times daily as needed (MUSCLE ACHES AND LOW BACK PAIN)             miconazole (MICOTIN) 2 % powder  Apply topically 2 times daily. nitroGLYCERIN (NITROSTAT) 0.4 MG SL tablet  Place 0.4 mg under the tongue every 5 minutes as needed for Chest pain             PARoxetine (PAXIL) 40 MG tablet  TAKE 1 TABLET BY MOUTH EVERY MORNING             PULMICORT FLEXHALER 180 MCG/ACT AEPB inhaler  INHALE 2 PUFFS INTO THE LUNGS 2 TIMES DAILY             ranitidine (ZANTAC) 150 MG tablet  TAKE 1 TABLET BY MOUTH 2 TIMES DAILY. SKIN PROTECTANTS, MISC. EX  Apply 1 Squirt topically 3 times daily as needed             VESICARE 10 MG tablet  TAKE 1 TABLET BY MOUTH DAILY                 Objective Findings at Discharge:   BP (!) 130/100   Pulse 97   Temp 98.4 °F (36.9 °C) (Axillary)   Resp 17   Ht 5' 8\" (1.727 m)   Wt 195 lb 14.4 oz (88.9 kg)   SpO2 100%   BMI 29.79 kg/m²            PHYSICAL EXAM   GEN Awake female, sitting upright in bed in no apparent distress. Appears given age. EYES Pupils are equally round. No scleral erythema, discharge, or conjunctivitis. HENT Mucous membranes are moist. Oral pharynx without exudates, no evidence of thrush. NECK Supple, no apparent thyromegaly or masses. RESP Clear to auscultation, no wheezes, rales or rhonchi. Symmetric chest movement while on room air. CARDIO/VASC S1/S2 auscultated. Regular rate without appreciable murmurs, rubs, or gallops. No JVD or carotid bruits. Peripheral pulses equal bilaterally and palpable. No peripheral edema. GI Abdomen is soft without significant tenderness, masses, or guarding. Bowel sounds are normoactive. Rectal exam deferred.  No costovertebral angle tenderness. Normal appearing external genitalia. Pappas catheter is not present. HEME/LYMPH No palpable cervical lymphadenopathy and no hepatosplenomegaly. No petechiae or ecchymoses. MSK No gross joint deformities. SKIN Normal coloration, warm, dry. NEURO Cranial nerves appear grossly intact, normal speech, no lateralizing weakness. PSYCH Awake, alert, oriented x 4. Affect appropriate.     BMP/CBC  Recent Labs     02/07/20  0709 02/08/20  0458 02/08/20  1500 02/09/20  0225    141  --  140   K 3.9 4.0  --  4.0    111*  --  109   CO2 18* 17*  --  18*   BUN 23 24*  --  26*   CREATININE 0.8 0.8  --  1.0   WBC 13.9*  --  14.7* 16.4*   HCT 34.0*  --  34.2* 36.5*     --  372 451*       IMAGING:  As above    Discharge Time of 35 minutes    Electronically signed by Jose R Sarmiento MD on 2/9/2020 at 10:36 AM

## 2020-02-27 NOTE — PROGRESS NOTES
CARDIOLOGY NOTE      2/27/2020    RE: Lorie [address]tone [address].  (1940)                               TO:  Dr. Ban Terrazas MD            Deirdre Wild is a 79 y.o. female who was seen today for management of  htn                                    HPI:   Patient is here for    - Hypertension,is  well controlled, pt is  compliant with medicines. - Hyperlipidimea, lipids are in acceptable range. Pt  is  compliant with medicines  - Atrial fibrillation, pt is  compliant with meds.  Patient does not have symptoms from atrial fibrillation                      The patient does not have cardiac complaints    Lorie Hernandez Ben Li. has the following history recorded in care path:  Patient Active Problem List    Diagnosis Date Noted    Sepsis due to Gram negative bacteria ([address] 03/09/2017     Priority: High    Acute kidney injury ([address] 03/08/2017     Priority: High    Bradycardia      Priority: High    Syncope and collapse 11/09/2015     Priority: High    SIRS due to Gram-negative infection 11/09/2015     Priority: High    Lymphedema of lower extremity 12/05/2011     Priority: High     Class: Chronic    Chronic atrial fibrillation 11/09/2015     Priority: Medium    Hypertension      Priority: Low    Hypotension 01/30/2020    MISA (acute kidney injury) ([address]     Hematoma 12/06/2019    Traumatic hematoma of right lower leg     Urinary tract infection associated with indwelling urethral catheter (HCC)     Atrial fibrillation, chronic     Memory loss     Controlled type 2 diabetes mellitus with diabetic mononeuropathy, with long-term current use of insulin ([address] 08/10/2016    Vasovagal syncope     Diabetic mononeuropathy associated with type 2 diabetes mellitus ([address] 10/14/2015    Diabetic nephropathy associated with type 2 diabetes mellitus ([address] 10/14/2015    Osteoarthritis     Incontinence of urine     Obstructive sleep apnea on CPAP     H/O cardiac catheterization     Asthma     capsule TAKE ONE CAPSULE BY MOUTH 3 TIMES A DAY AS NEEDED 120 capsule 3    PULMICORT FLEXHALER 180 MCG/ACT AEPB inhaler INHALE 2 PUFFS INTO THE LUNGS 2 TIMES DAILY 1 each 5    Blood Glucose Monitoring Suppl (ONE TOUCH ULTRA MINI) W/DEVICE KIT 1 kit by Does not apply route daily 1 kit 0    Incontinence Supplies ([place] [facility]) KIT Use as directed by doctor. 24 inc with a 30 balloon. 1 kit 3    albuterol sulfate  (90 BASE) MCG/ACT inhaler Inhale 2 puffs into the lungs every 6 hours as needed for Wheezing 3 Inhaler 1    SKIN PROTECTANTS, MISC. EX Apply 1 Squirt topically 3 times daily as needed      ferrous sulfate 325 (65 FE) MG tablet Take 325 mg by mouth daily (with breakfast)      fexofenadine (ALLEGRA) 180 MG tablet Take 180 mg by mouth daily      meclizine (ANTIVERT) 12.5 MG tablet Take 12.5 mg by mouth 3 times daily as needed      busPIRone (BUSPAR) 7.5 MG tablet Take 5 mg by mouth 2 times daily       nitroGLYCERIN (NITROSTAT) 0.4 MG SL tablet Place 0.4 mg under the tongue every 5 minutes as needed for Chest pain      Fesoterodine Fumarate ER (TOVIAZ) 8 MG TB24 Take 8 mg by mouth daily      Incontinence Supplies MISC by Does not apply route Indications: (URINARY LEG BAG)       No current facility-administered medications for this visit. Allergies: Monopril [fosinopril sodium]; Norvasc [amlodipine besylate]; Phenobarbital; Ziac [bisoprolol-hydrochlorothiazide]; Bactrim [sulfamethoxazole-trimethoprim]; Chocolate; and Penicillins  Past Medical History:   Diagnosis Date    Allergic rhinitis     Asthma     Atrial fibrillation (HCC)     **PCP follows PT/INRs, along w/prescribing Coumadin. **    CAD (coronary artery disease)     Carotid artery stenosis 11/15/2004    Mild disease noted bilaterally- per Carotid doppler    Carpal tunnel syndrome, bilateral     S/P bilateral repair 1994    COPD (chronic obstructive pulmonary disease) [facility]     Depression     Diabetes type 2, controlled 28.8 kg/m². GENERAL - Alert, oriented, pleasant, in no apparent distress. EYES: No jaundice, no conjunctival pallor. SKIN: It is warm & dry. No rashes. No Echhymosis    HEENT - No clinically significant abnormalities seen. Neck - Supple. No jugular venous distention noted. No carotid bruits. Cardiovascular - Normal S1 and S2 without obvious murmur or gallop. Extremities - No cyanosis, clubbing, or significant edema. Pulmonary - No respiratory distress. No wheezes or rales. Abdomen - No masses, tenderness, or organomegaly. Musculoskeletal - No significant edema. No joint deformities. No muscle wasting. Neurologic - Cranial nerves II through XII are grossly intact. There were no gross focal neurologic abnormalities.     Lab Review   Lab Results   Component Value Date    TROPONINT 0.011 01/29/2020     BNP:    Lab Results   Component Value Date     09/23/2011     PT/INR:    Lab Results   Component Value Date    INR 1.51 12/06/2019     Lab Results   Component Value Date    LABA1C 4.7 12/07/2019    LABA1C 5.6 03/05/2017     Lab Results   Component Value Date    WBC 16.4 (H) 02/09/2020    HCT 36.5 (L) 02/09/2020    .8 (H) 02/09/2020     (H) 02/09/2020     Lab Results   Component Value Date    CHOL 103 03/01/2017    TRIG 91 03/01/2017    HDL 44 03/01/2017    LDLCALC 48 10/14/2015    LDLDIRECT 56 03/01/2017     Lab Results   Component Value Date    ALT 6 (L) 01/31/2020    AST 10 (L) 01/31/2020     BMP:    Lab Results   Component Value Date     02/09/2020    K 4.0 02/09/2020     02/09/2020    CO2 18 02/09/2020    BUN 26 02/09/2020    CREATININE 1.0 02/09/2020     CMP:   Lab Results   Component Value Date     02/09/2020    K 4.0 02/09/2020     02/09/2020    CO2 18 02/09/2020    BUN 26 02/09/2020    PROT 5.5 01/31/2020    PROT 6.9 01/04/2013     TSH:    Lab Results   Component Value Date    TSH 4.2 11/08/2013    TSHHS 2.820 03/01/2017         Impression:    No

## 2020-02-27 NOTE — PATIENT INSTRUCTIONS
Please hold on to these instructions the  will call you within 1-9 business days when we receive authorization from your insurance. Echocardiogram    WHAT TO EXPECT:   ? This test will take approximately 45 minutes. ? It is an ultrasound of the heart. ? It can look at the valves and chambers inside the heart   ? There is no special instructions for this test.     If you are unable to keep this appointment, please notify us 24 hours prior to test at [phone].

## 2020-02-28 NOTE — TELEPHONE ENCOUNTER
[address] [facility] to schedule Echo, nurse is passing meds and will have to call us back to schedule.  [facility] Medicare NO AUTH NEEDED OK TO SCHEDULE

## 2020-03-03 NOTE — TELEPHONE ENCOUNTER
Faxed referral, demographics, ins card, and office note to Dr. Melanie Benjamin office at Fax# [phone].

## 2020-03-09 NOTE — PATIENT INSTRUCTIONS
Referral sent to wound care for wound vac to be applied   Until wound vac is applied keep wound clean and dry   (ONLY USE NONADHERENT AND XEROFORM ON THE WOUND)  Follow up in 6 weeks and please inform son of appointment

## 2020-03-10 NOTE — PROGRESS NOTES
Subjective:      Patient ID: Jadon Hernandez is a 79 y.o. female. Patient returns today for post op check of the right left I&D DOS 12/6/19. She comes in today for her 3-month postop recheck after I&D of her right leg hematoma. She continues to be cared for at the [facility]. She has been having dressing changes to her right lower extremity but she states they have been very intermittent and inconsistent. .  Patient denies any new injury to the involved extremity/ joint, denies numbness or tingling in the involved extremity and denies fever or chills. Review of Systems   Constitutional: Negative for activity change, chills and fever. Respiratory: Negative for chest tightness. Cardiovascular: Negative for chest pain. Musculoskeletal: Positive for gait problem, joint swelling and myalgias. Negative for arthralgias and back pain. Skin: Negative for color change, pallor, rash and wound. Neurological: Positive for weakness. Negative for numbness. Psychiatric/Behavioral: Positive for confusion. Past Medical History:   Diagnosis Date    Allergic rhinitis     Asthma     Atrial fibrillation (HCC)     **PCP follows PT/INRs, along w/prescribing Coumadin. **    CAD (coronary artery disease)     Carotid artery stenosis 11/15/2004    Mild disease noted bilaterally- per Carotid doppler    Carpal tunnel syndrome, bilateral     S/P bilateral repair 1994    COPD (chronic obstructive pulmonary disease) ([address]     Depression     Diabetes type 2, controlled ([address]     Diabetes type 2, controlled ([address]     Emphysema     Family history of diabetes mellitus (DM)     Mother and Father    Gastro-esophageal reflux disease with esophagitis     H/O 24 hour EKG monitoring 10/10/2001    10/10/2001-Predominant rhythm is sinus rhythm. Infrequent ventricular ectopic beats noted.  Freq episodes of suprventricular ectopy noted with episodes of runs of supraventricular tachycardia and also 1 short run of atrial fibrillation.  H/O cardiac catheterization 7/21/2003, 9/26/2001 7/21/2003-No significant CAD. Cardiolite was probably false positive.  H/O cardiovascular stress test 8/3/2005, 11/11/2004, 7/10/2003,9/5/2001    8/3/2005-EF 66%. Normal exercise performance without angina or ischemic EKG changes. Cardiolite study demonstrates normal perfusion in all segments of the myocardium with an intact LV systolic function    H/O Doppler ultrasound 11/15/2004    CAROTID DOPPLER-11/15/2004-Mild degree of disease noted bilaterally. Vertebral artery has normal antegrade flow.  H/O echocardiogram 7/19/2005, 11/15/2004, 7/10/2003, 7/2001 5/51/5566- LV systolic function normal. EF =>55%. Left atrium is mildly dilated. Right ventricular systolic pressure is elevated at 40-50mmHg.  Herniation of lumbar intervertebral disc     Hyperlipidemia     Hypertension     Incontinence of urine     self cath- followed by Dr Jeff Aguirre Irritable bowel syndrome     Memory loss     starting trial aricept 5mg daily 11/3/16    Menopause     Obesity     Obstructive sleep apnea on CPAP     since 1991    Osteoarthritis     bilateral knees    S/P colonoscopy 8/2010    Dr Bethany Daniels, recheck 2015    Venous insufficiency (chronic) (peripheral)        Objective:   Physical Exam  Constitutional:       Appearance: She is well-developed. HENT:      Head: Normocephalic. Eyes:      Pupils: Pupils are equal, round, and reactive to light. Neck:      Musculoskeletal: Normal range of motion. Pulmonary:      Effort: Pulmonary effort is normal.   Musculoskeletal: Normal range of motion. General: Tenderness present. No deformity. Right hip: Normal.      Left hip: Normal.      Right knee: She exhibits normal range of motion, no swelling, no effusion, no ecchymosis, no deformity, no laceration, no erythema, normal alignment, no LCL laxity, normal patellar mobility, no bony tenderness and no MCL laxity. No tenderness found. No medial joint line, no lateral joint line, no MCL, no LCL and no patellar tendon tenderness noted. Left knee: Normal. She exhibits normal range of motion, no swelling, no effusion, no ecchymosis, no deformity, no laceration, no erythema, normal alignment, no LCL laxity, normal patellar mobility, no bony tenderness and no MCL laxity. No tenderness found. No medial joint line, no lateral joint line, no MCL, no LCL and no patellar tendon tenderness noted. Right lower leg: She exhibits tenderness and swelling. She exhibits no bony tenderness. Edema present. Skin:     General: Skin is warm and dry. Capillary Refill: Capillary refill takes less than 2 seconds. Coloration: Skin is not pale. Findings: Lesion present. No erythema or rash. Neurological:      Mental Status: She is alert and oriented to person, place, and time. Right leg-there is a large area of superficial skin granulation tissue which has decreased in size compared to prior exam but continues to have coagulated blood with minimal skin formation. No signs of infection or purulent drainage present. She is able to dorsiflex and plantarflex her foot without much pain and can flex and extend her knee without much pain. Assessment:      Right leg hematoma I&D, 3 months   Right leg superficial skin necrosis      Plan:       I discussed with her today that her skin is healing very slowly. At this point I will get her set up for the wound clinic to perform wound VAC changes 3 times a week. If this does not help we could consider a skin graft surgically but she would like to avoid additional surgical treatment at this point. Continue weight-bearing as tolerated. Continue range of motion exercises as instructed. Ice and elevate as needed. Tylenol or Motrin for pain. Follow up in 6 weeks for recheck.         Gian Castañeda, DO

## 2020-03-25 PROBLEM — N17.9 ACUTE KIDNEY INJURY (HCC): Status: RESOLVED | Noted: 2017-03-08 | Resolved: 2020-01-01

## (undated) DEVICE — GAUZE,SPONGE,4"X4",16PLY,XRAY,STRL,LF: Brand: MEDLINE

## (undated) DEVICE — ELECTRODE ES AD CRDLSS PT RET REM POLYHESIVE

## (undated) DEVICE — PACK,BASIC,IX: Brand: MEDLINE

## (undated) DEVICE — DRAPE,U/ SHT,SPLIT,PLAS,STERIL: Brand: MEDLINE

## (undated) DEVICE — BANDAGE COMPR M W6INXL10YD WHT BGE VELC E MTRX HK AND LOOP

## (undated) DEVICE — FAN SPRAY KIT: Brand: PULSAVAC®

## (undated) DEVICE — SOLUTION PREP POVIDONE IOD FOR SKIN MUCOUS MEM PRIOR TO

## (undated) DEVICE — GLOVE SURG SZ 8 L12IN THK75MIL DK GRN LTX FREE

## (undated) DEVICE — GLOVE ORANGE PI 8   MSG9080

## (undated) DEVICE — TOWEL,OR,DSP,ST,BLUE,STD,6/PK,12PK/CS: Brand: MEDLINE

## (undated) DEVICE — GLOVE ORANGE PI 7 1/2   MSG9075

## (undated) DEVICE — DRAPE,EXTREMITY,89X128,STERILE: Brand: MEDLINE

## (undated) DEVICE — Z INACTIVE USE 2660664 SOLUTION IRRIG 3000ML 0.9% SOD CHL USP UROMATIC PLAS CONT

## (undated) DEVICE — TRAY PREP DRY W/ PREM GLV 2 APPL 6 SPNG 2 UNDPD 1 OVERWRAP

## (undated) DEVICE — YANKAUER,FLEXIBLE HANDLE,REGLR CAPACITY: Brand: MEDLINE INDUSTRIES, INC.

## (undated) DEVICE — BANDAGE,SELF ADHRNT,COFLEX,4"X5YD,STRL: Brand: COLABEL

## (undated) DEVICE — SYRINGE IRRIG 60ML SFT PLIABLE BLB EZ TO GRP 1 HND USE W/

## (undated) DEVICE — SOLUTION IV IRRIG WATER 1000ML POUR BRL 2F7114

## (undated) DEVICE — TUBE CULTURE LF UNIFORM BOTTOM STER

## (undated) DEVICE — GOWN,SIRUS,POLYRNF,BRTHSLV,XLN/XL,20/CS: Brand: MEDLINE

## (undated) DEVICE — LINER,SEMI-RIGID,3000CC,50EA/CS: Brand: MEDLINE

## (undated) DEVICE — PENCIL ES CRD L10FT HND SWCHING ROCK SWCH W/ EDGE COAT BLDE

## (undated) DEVICE — SPONGE LAP W18XL18IN WHT COT 4 PLY FLD STRUNG RADPQ DISP ST

## (undated) DEVICE — PADDING CAST W6INXL4YD COT LO LINTING WYTEX

## (undated) DEVICE — INTENDED FOR TISSUE SEPARATION, AND OTHER PROCEDURES THAT REQUIRE A SHARP SURGICAL BLADE TO PUNCTURE OR CUT.: Brand: BARD-PARKER ® STAINLESS STEEL BLADES

## (undated) DEVICE — SPONGE GZ W4XL8IN COT WVN 12 PLY

## (undated) DEVICE — CHLORAPREP 26ML ORANGE

## (undated) DEVICE — GLOVE ORANGE PI 7   MSG9070

## (undated) DEVICE — SHEET, T, LAPAROTOMY, STERILE: Brand: MEDLINE

## (undated) DEVICE — TUBING, SUCTION, 9/32" X 10', STRAIGHT: Brand: MEDLINE

## (undated) DEVICE — PAD,ABDOMINAL,5"X9",ST,LF,25/BX: Brand: MEDLINE INDUSTRIES, INC.

## (undated) DEVICE — ANESTHESIA CIRCUIT ADULT-LF: Brand: MEDLINE INDUSTRIES, INC.

## (undated) DEVICE — LINER SUCT CANSTR 1500CC SEMI RIG W/ POR HYDROPHOBIC SHUT

## (undated) DEVICE — DRESSING PETRO W3XL3IN OIL EMUL N ADH GZ KNIT IMPREG CELOS